# Patient Record
Sex: MALE | Race: WHITE | NOT HISPANIC OR LATINO | Employment: OTHER | URBAN - METROPOLITAN AREA
[De-identification: names, ages, dates, MRNs, and addresses within clinical notes are randomized per-mention and may not be internally consistent; named-entity substitution may affect disease eponyms.]

---

## 2023-04-08 PROBLEM — R06.01 ORTHOPNEA: Status: ACTIVE | Noted: 2023-04-08

## 2023-04-08 PROBLEM — E87.6 HYPOKALEMIA: Status: ACTIVE | Noted: 2023-04-08

## 2023-04-08 PROBLEM — I10 HYPERTENSION: Status: ACTIVE | Noted: 2023-04-08

## 2023-04-12 PROBLEM — R73.09 ELEVATED GLUCOSE: Status: ACTIVE | Noted: 2023-04-12

## 2023-04-12 PROBLEM — K76.0 FATTY LIVER: Status: ACTIVE | Noted: 2023-04-12

## 2023-04-26 ENCOUNTER — OFFICE VISIT (OUTPATIENT)
Dept: FAMILY MEDICINE CLINIC | Facility: CLINIC | Age: 73
End: 2023-04-26

## 2023-04-26 VITALS
HEIGHT: 71 IN | DIASTOLIC BLOOD PRESSURE: 92 MMHG | SYSTOLIC BLOOD PRESSURE: 160 MMHG | BODY MASS INDEX: 25.2 KG/M2 | WEIGHT: 180 LBS

## 2023-04-26 DIAGNOSIS — R73.09 ELEVATED GLUCOSE: ICD-10-CM

## 2023-04-26 DIAGNOSIS — Z13.220 SCREENING FOR LIPOID DISORDERS: ICD-10-CM

## 2023-04-26 DIAGNOSIS — Z11.59 NEED FOR HEPATITIS C SCREENING TEST: ICD-10-CM

## 2023-04-26 DIAGNOSIS — E87.6 HYPOKALEMIA: ICD-10-CM

## 2023-04-26 DIAGNOSIS — I10 PRIMARY HYPERTENSION: Primary | ICD-10-CM

## 2023-04-26 DIAGNOSIS — Z12.11 SCREEN FOR COLON CANCER: ICD-10-CM

## 2023-04-26 DIAGNOSIS — K76.0 FATTY LIVER: ICD-10-CM

## 2023-04-26 RX ORDER — LISINOPRIL 40 MG/1
40 TABLET ORAL DAILY
Qty: 30 TABLET | Refills: 0 | Status: SHIPPED | OUTPATIENT
Start: 2023-04-26

## 2023-04-26 NOTE — PROGRESS NOTES
Chief Complaint   Patient presents with   • Hypertension     Name: Sergey Goodman      : 1950      MRN: [id number]  Encounter Provider: Kurt Adamson MD  Encounter Date: 2023   Encounter department: [address] [place] [address]     Blood pressure continues to be elevated today 160/92  We will discontinue losartan and restart patient's lisinopril 40 mg   Prescription sent to pharmacy  Continue amlodipine 10 mg  He did have a low potassium in the hospital, labs were reviewed  We will recheck a CMP  Also reports a history of fatty liver, check CMP for liver function  Sugars were also found to be elevated in the hospital at 160, will recheck this  Check hepatitis C screen  Cologuard ordered as patient does not want to have a colonoscopy  RTC in 1 month for follow-up as well as Medicare annual wellness visit    1  Primary hypertension  -     lisinopril (ZESTRIL) 40 mg tablet; Take 1 tablet (40 mg total) by mouth daily    2  Hypokalemia  -     Comprehensive metabolic panel; Future    3  Fatty liver  -     Comprehensive metabolic panel; Future    4  Elevated glucose  -     Comprehensive metabolic panel; Future    5  Need for hepatitis C screening test  -     Hepatitis C antibody; Future    6  Screening for lipoid disorders  -     Lipid Panel with Direct LDL reflex; Future    7  Screen for colon cancer  -     Cologuard         Subjective      He presents today for follow-up on high blood pressure  He has been keeping a log at home  Blood pressures have been elevated in the 612L and 563H colic  Denies any headaches blurred vision or chest pain  Reports that he used to be on lisinopril 40 mg and did well on that, however the ER put him on losartan instead  Would like to restart lisinopril  Review of Systems   Constitutional: Negative for activity change, appetite change, chills, fatigue and fever  HENT: Negative for congestion, rhinorrhea, sneezing and sore throat  "   Eyes: Negative for pain, discharge, redness and itching  Respiratory: Negative for cough, chest tightness, shortness of breath and wheezing  Cardiovascular: Negative for chest pain and palpitations  Gastrointestinal: Negative for abdominal pain, constipation, diarrhea, nausea and vomiting  Musculoskeletal: Negative for arthralgias, gait problem, myalgias and neck pain  Skin: Negative for rash  Neurological: Negative for dizziness, weakness, numbness and headaches  Psychiatric/Behavioral: Negative for dysphoric mood  All other systems reviewed and are negative  Current Outpatient Medications on File Prior to Visit   Medication Sig   • amLODIPine (NORVASC) 10 mg tablet Take 1 tablet (10 mg total) by mouth daily Do not start before April 10, 2023  • [DISCONTINUED] losartan (COZAAR) 50 mg tablet Take 1 tablet (50 mg total) by mouth daily       Objective     /92   Ht 5' 11\" (1 803 m)   Wt 81 6 kg (180 lb)   BMI 25 10 kg/m²     Physical Exam  Vitals reviewed  Constitutional:       General: He is not in acute distress  Appearance: Normal appearance  He is well-developed  He is not toxic-appearing or diaphoretic  HENT:      Head: Normocephalic and atraumatic  Nose: Nose normal    Eyes:      General: No scleral icterus  Right eye: No discharge  Left eye: No discharge  Conjunctiva/sclera: Conjunctivae normal    Cardiovascular:      Rate and Rhythm: Normal rate and regular rhythm  Pulses: Normal pulses  Heart sounds: Normal heart sounds  No murmur heard  Pulmonary:      Effort: Pulmonary effort is normal  No respiratory distress  Breath sounds: Normal breath sounds  No wheezing  Abdominal:      General: Abdomen is flat  There is no distension  Palpations: Abdomen is soft  There is no mass  Tenderness: There is no abdominal tenderness  Hernia: No hernia is present  Musculoskeletal:         General: No tenderness   Normal range " of motion  Skin:     General: Skin is warm  Findings: No erythema or rash  Neurological:      Mental Status: He is alert     Psychiatric:         Mood and Affect: Mood normal          Behavior: Behavior normal        Carolina Lamas MD

## 2023-05-08 DIAGNOSIS — I16.0 HYPERTENSIVE URGENCY: ICD-10-CM

## 2023-05-08 RX ORDER — AMLODIPINE BESYLATE 10 MG/1
10 TABLET ORAL DAILY
Qty: 30 TABLET | Refills: 0 | Status: SHIPPED | OUTPATIENT
Start: 2023-05-08 | End: 2023-06-07

## 2023-05-21 DIAGNOSIS — I10 PRIMARY HYPERTENSION: ICD-10-CM

## 2023-05-22 RX ORDER — LISINOPRIL 40 MG/1
TABLET ORAL
Qty: 90 TABLET | Refills: 1 | Status: SHIPPED | OUTPATIENT
Start: 2023-05-22

## 2023-05-31 ENCOUNTER — OFFICE VISIT (OUTPATIENT)
Dept: FAMILY MEDICINE CLINIC | Facility: CLINIC | Age: 73
End: 2023-05-31

## 2023-05-31 VITALS
BODY MASS INDEX: 24.81 KG/M2 | DIASTOLIC BLOOD PRESSURE: 80 MMHG | TEMPERATURE: 97.6 F | HEIGHT: 71 IN | HEART RATE: 78 BPM | WEIGHT: 177.2 LBS | OXYGEN SATURATION: 98 % | SYSTOLIC BLOOD PRESSURE: 132 MMHG

## 2023-05-31 DIAGNOSIS — I10 PRIMARY HYPERTENSION: ICD-10-CM

## 2023-05-31 DIAGNOSIS — I16.0 HYPERTENSIVE URGENCY: ICD-10-CM

## 2023-05-31 DIAGNOSIS — Z00.00 MEDICARE ANNUAL WELLNESS VISIT, SUBSEQUENT: Primary | ICD-10-CM

## 2023-05-31 RX ORDER — AMLODIPINE BESYLATE 10 MG/1
10 TABLET ORAL DAILY
Qty: 90 TABLET | Refills: 1 | Status: SHIPPED | OUTPATIENT
Start: 2023-05-31

## 2023-05-31 NOTE — PATIENT INSTRUCTIONS
Medicare Preventive Visit Patient Instructions  Thank you for completing your Welcome to Medicare Visit or Medicare Annual Wellness Visit today  Your next wellness visit will be due in one year (5/31/2024)  The screening/preventive services that you may require over the next 5-10 years are detailed below  Some tests may not apply to you based off risk factors and/or age  Screening tests ordered at today's visit but not completed yet may show as past due  Also, please note that scanned in results may not display below  Preventive Screenings:  Service Recommendations Previous Testing/Comments   Colorectal Cancer Screening  · Colonoscopy    · Fecal Occult Blood Test (FOBT)/Fecal Immunochemical Test (FIT)  · Fecal DNA/Cologuard Test  · Flexible Sigmoidoscopy Age: 39-70 years old   Colonoscopy: every 10 years (May be performed more frequently if at higher risk)  OR  FOBT/FIT: every 1 year  OR  Cologuard: every 3 years  OR  Sigmoidoscopy: every 5 years  Screening may be recommended earlier than age 39 if at higher risk for colorectal cancer  Also, an individualized decision between you and your healthcare provider will decide whether screening between the ages of 74-80 would be appropriate   Colonoscopy: Not on file  FOBT/FIT: Not on file  Cologuard: Not on file  Sigmoidoscopy: Not on file          Prostate Cancer Screening Individualized decision between patient and health care provider in men between ages of 53-78   Medicare will cover every 12 months beginning on the day after your 50th birthday PSA: No results in last 5 years           Hepatitis C Screening Once for adults born between [facility]  More frequently in patients at high risk for Hepatitis C Hep C Antibody: Not on file        Diabetes Screening 1-2 times per year if you're at risk for diabetes or have pre-diabetes Fasting glucose: 160 mg/dL (4/9/2023)  A1C: No results in last 5 years (No results in last 5 years)  Screening Current   Cholesterol Screening Once every 5 years if you don't have a lipid disorder  May order more often based on risk factors  Lipid panel: 11/12/2020  Screening Current      Other Preventive Screenings Covered by Medicare:  1  Abdominal Aortic Aneurysm (AAA) Screening: covered once if your at risk  You're considered to be at risk if you have a family history of AAA or a male between the age of 73-68 who smoking at least 100 cigarettes in your lifetime  2  Lung Cancer Screening: covers low dose CT scan once per year if you meet all of the following conditions: (1) Age 50-69; (2) No signs or symptoms of lung cancer; (3) Current smoker or have quit smoking within the last 15 years; (4) You have a tobacco smoking history of at least 20 pack years (packs per day x number of years you smoked); (5) You get a written order from a healthcare provider  3  Glaucoma Screening: covered annually if you're considered high risk: (1) You have diabetes OR (2) Family history of glaucoma OR (3)  aged 48 and older OR (3)  American aged 72 and older  3  Osteoporosis Screening: covered every 2 years if you meet one of the following conditions: (1) Have a vertebral abnormality; (2) On glucocorticoid therapy for more than 3 months; (3) Have primary hyperparathyroidism; (4) On osteoporosis medications and need to assess response to drug therapy  5  HIV Screening: covered annually if you're between the age of 12-76  Also covered annually if you are younger than 13 and older than 72 with risk factors for HIV infection  For pregnant patients, it is covered up to 3 times per pregnancy      Immunizations:  Immunization Recommendations   Influenza Vaccine Annual influenza vaccination during flu season is recommended for all persons aged >= 6 months who do not have contraindications   Pneumococcal Vaccine   * Pneumococcal conjugate vaccine = PCV13 (Prevnar 13), PCV15 (Vaxneuvance), PCV20 (Prevnar 20)  * Pneumococcal polysaccharide vaccine = PPSV23 (Pneumovax) Adults 2364 years old: 1-3 doses may be recommended based on certain risk factors  Adults 72 years old: 1-2 doses may be recommended based off what pneumonia vaccine you previously received   Hepatitis B Vaccine 3 dose series if at intermediate or high risk (ex: diabetes, end stage renal disease, liver disease)   Tetanus (Td) Vaccine - COST NOT COVERED BY MEDICARE PART B Following completion of primary series, a booster dose should be given every 10 years to maintain immunity against tetanus  Td may also be given as tetanus wound prophylaxis  Tdap Vaccine - COST NOT COVERED BY MEDICARE PART B Recommended at least once for all adults  For pregnant patients, recommended with each pregnancy  Shingles Vaccine (Shingrix) - COST NOT COVERED BY MEDICARE PART B  2 shot series recommended in those aged 48 and above     Health Maintenance Due:      Topic Date Due   • Hepatitis C Screening  Never done   • Colorectal Cancer Screening  Never done     Immunizations Due:      Topic Date Due   • COVID-19 Vaccine (1) Never done     Advance Directives   What are advance directives? Advance directives are legal documents that state your wishes and plans for medical care  These plans are made ahead of time in case you lose your ability to make decisions for yourself  Advance directives can apply to any medical decision, such as the treatments you want, and if you want to donate organs  What are the types of advance directives? There are many types of advance directives, and each state has rules about how to use them  You may choose a combination of any of the following:  · Living will: This is a written record of the treatment you want  You can also choose which treatments you do not want, which to limit, and which to stop at a certain time  This includes surgery, medicine, IV fluid, and tube feedings  · Durable power of  for healthcare [facility]):   This is a written record that states who you want to make healthcare choices for you when you are unable to make them for yourself  This person, called a proxy, is usually a family member or a friend  You may choose more than 1 proxy  · Do not resuscitate (DNR) order:  A DNR order is used in case your heart stops beating or you stop breathing  It is a request not to have certain forms of treatment, such as CPR  A DNR order may be included in other types of advance directives  · Medical directive: This covers the care that you want if you are in a coma, near death, or unable to make decisions for yourself  You can list the treatments you want for each condition  Treatment may include pain medicine, surgery, blood transfusions, dialysis, IV or tube feedings, and a ventilator (breathing machine)  · Values history: This document has questions about your views, beliefs, and how you feel and think about life  This information can help others choose the care that you would choose  Why are advance directives important? An advance directive helps you control your care  Although spoken wishes may be used, it is better to have your wishes written down  Spoken wishes can be misunderstood, or not followed  Treatments may be given even if you do not want them  An advance directive may make it easier for your family to make difficult choices about your care  © Copyright [address] 2018 Information is for End User's use only and may not be sold, redistributed or otherwise used for commercial purposes   All illustrations and images included in CareNotes® are the copyrighted property of [employer]  or [address] [place] [address] "[employer]

## 2023-05-31 NOTE — PROGRESS NOTES
Assessment and Plan:     Patient presents for Medicare annual wellness visit  Labs ordered previously, patient to have them drawn  Patient did receive Cologuard kit, encourage patient to complete Cologuard screening  Declines CT lung cancer screening today as well as AAA screening  Will consider in the future  Amlodipine refill sent to pharmacy  RTC in 6 months for follow-up    Problem List Items Addressed This Visit        Cardiovascular and Mediastinum    Hypertension    Relevant Medications    amLODIPine (NORVASC) 10 mg tablet   Other Visit Diagnoses     Medicare annual wellness visit, subsequent    -  Primary    Hypertensive urgency        Relevant Medications    amLODIPine (NORVASC) 10 mg tablet          Depression Screening and Follow-up Plan: Patient was screened for depression during today's encounter  They screened negative with a PHQ-2 score of 0  Lung Cancer Screening Shared Decision Making: I discussed with him that he is a candidate for lung cancer CT screening  The following Shared Decision-Making points were covered:  1  Benefits of screening were discussed, including the rates of reduction in death from lung cancer and other causes  Harms of screening were reviewed, including false positive tests, radiation exposure levels, risks of invasive procedures, risks of complications of screening, and risk of overdiagnosis  2  I counseled on the importance of adherence to annual lung cancer LDCT screening, impact of co-morbidities, and ability or willingness to undergo diagnosis and treatment    3  I counseled on the importance of maintaining abstinence as a former smoker or was counseled on the importance of smoking cessation if a current smoker    Review of Eligibility Criteria: He meets all of the criteria for Lung Cancer Screening    - He is 81 y o    - He has 20 pack year tobacco history and is a current smoker or has quit within the past 15 years  - He presents no signs or symptoms of lung cancer    After discussion, the patient decided to elect lung cancer screening  Preventive health issues were discussed with patient, and age appropriate screening tests were ordered as noted in patient's After Visit Summary  Personalized health advice and appropriate referrals for health education or preventive services given if needed, as noted in patient's After Visit Summary  History of Present Illness:     Patient presents for a Medicare Wellness Visit    He presents today for Medicare annual wellness exam   Overall doing well  No complaints today  Patient Care Team:  Rhonda Lewis MD as PCP - General (Family Medicine)     Review of Systems:     Review of Systems   Constitutional: Negative for activity change, appetite change, chills, fatigue and fever  HENT: Negative for congestion, rhinorrhea, sneezing and sore throat  Eyes: Negative for pain, discharge, redness and itching  Respiratory: Negative for cough, chest tightness, shortness of breath and wheezing  Cardiovascular: Negative for chest pain and palpitations  Gastrointestinal: Negative for abdominal pain, constipation, diarrhea, nausea and vomiting  Musculoskeletal: Negative for arthralgias, gait problem, myalgias and neck pain  Skin: Negative for rash  Neurological: Negative for dizziness, weakness, numbness and headaches  Psychiatric/Behavioral: Negative for dysphoric mood and suicidal ideas  The patient is not nervous/anxious  All other systems reviewed and are negative  Problem List:     Patient Active Problem List   Diagnosis   • Hypertension   • Hypokalemia   • Orthopnea   • Fatty liver   • Elevated glucose      Past Medical and Surgical History:     Past Medical History:   Diagnosis Date   • Hypertension    • Liver disease      Past Surgical History:   Procedure Laterality Date   • PROSTATE SURGERY        Family History:     History reviewed  No pertinent family history     Social History: Social History     Socioeconomic History   • Marital status: Single     Spouse name: None   • Number of children: None   • Years of education: None   • Highest education level: None   Occupational History   • None   Tobacco Use   • Smoking status: Former     Types: Cigarettes     Quit date: 2020     Years since quitting: 3 4   • Smokeless tobacco: Never   Vaping Use   • Vaping Use: Never used   Substance and Sexual Activity   • Alcohol use: Not Currently   • Drug use: Not Currently   • Sexual activity: None   Other Topics Concern   • None   Social History Narrative   • None     Social Determinants of Health     Financial Resource Strain: Not on file   Food Insecurity: Not on file   Transportation Needs: Not on file   Physical Activity: Not on file   Stress: Not on file   Social Connections: Not on file   Intimate Partner Violence: Not on file   Housing Stability: Not on file      Medications and Allergies:     Current Outpatient Medications   Medication Sig Dispense Refill   • amLODIPine (NORVASC) 10 mg tablet Take 1 tablet (10 mg total) by mouth daily 90 tablet 1   • lisinopril (ZESTRIL) 40 mg tablet TAKE 1 TABLET BY MOUTH EVERY DAY 90 tablet 1     No current facility-administered medications for this visit  No Known Allergies   Immunizations:     Immunization History   Administered Date(s) Administered   • INFLUENZA 11/04/2020   • Influenza Split High Dose Preservative Free IM 11/18/2022   • Influenza, Seasonal Vaccine, Quadrivalent, Adjuvanted,  5e 11/22/2021   • Pneumococcal Conjugate 13-Valent 02/19/2020   • Pneumococcal Polysaccharide PPV23 11/22/2021, 11/18/2022      Health Maintenance:         Topic Date Due   • Hepatitis C Screening  Never done   • Colorectal Cancer Screening  Never done         Topic Date Due   • COVID-19 Vaccine (1) Never done      Medicare Screening Tests and Risk Assessments:     Edy is here for his Subsequent Wellness visit   Last Medicare Wellness visit information reviewed, patient interviewed and updates made to the record today  Health Risk Assessment:   Patient rates overall health as good  Patient feels that their physical health rating is same  Patient is satisfied with their life  Eyesight was rated as same  Hearing was rated as slightly worse  Patient feels that their emotional and mental health rating is same  Patients states they are never, rarely angry  Patient states they are sometimes unusually tired/fatigued  Pain experienced in the last 7 days has been none  Patient states that he has experienced no weight loss or gain in last 6 months  Depression Screening:   PHQ-2 Score: 0      Fall Risk Screening: In the past year, patient has experienced: no history of falling in past year      Home Safety:  Patient does not have trouble with stairs inside or outside of their home  Patient has working smoke alarms and has working carbon monoxide detector  Home safety hazards include: none  Nutrition:   Current diet is Regular  Medications:   Patient is not currently taking any over-the-counter supplements  Patient is able to manage medications  Activities of Daily Living (ADLs)/Instrumental Activities of Daily Living (IADLs):   Walk and transfer into and out of bed and chair?: Yes  Dress and groom yourself?: Yes    Bathe or shower yourself?: Yes    Feed yourself?  Yes  Do your laundry/housekeeping?: Yes  Manage your money, pay your bills and track your expenses?: Yes  Make your own meals?: Yes    Do your own shopping?: Yes    Previous Hospitalizations:   Any hospitalizations or ED visits within the last 12 months?: Yes    How many hospitalizations have you had in the last year?: 1-2    Advance Care Planning:   Living will: No    Durable POA for healthcare: No    Advanced directive: No      PREVENTIVE SCREENINGS      Cardiovascular Screening:    General: Screening Current      Diabetes Screening:     General: Screening Current      Colorectal Cancer Screening: "General: Risks and Benefits Discussed    Due for: Cologuard      Prostate Cancer Screening:    General: Patient Declines and Risks and Benefits Discussed    Due for: PSA      Osteoporosis Screening:    General: Screening Not Indicated      Abdominal Aortic Aneurysm (AAA) Screening:    Risk factors include: age between 73-67 yo and tobacco use        General: Risks and Benefits Discussed and Patient Declines    Due for: Screening AAA Ultrasound      Lung Cancer Screening:     General: Risks and Benefits Discussed and Patient Declines    Due for: Low Dose CT (LDCT)      Hepatitis C Screening:    General: Risks and Benefits Discussed    Hep C Screening Accepted: Yes      Screening, Brief Intervention, and Referral to Treatment (SBIRT)    Screening      Single Item Drug Screening:  How often have you used an illegal drug (including marijuana) or a prescription medication for non-medical reasons in the past year? never    Single Item Drug Screen Score: 0  Interpretation: Negative screen for possible drug use disorder    No results found  Physical Exam:     /80   Pulse 78   Temp 97 6 °F (36 4 °C)   Ht 5' 11\" (1 803 m)   Wt 80 4 kg (177 lb 3 2 oz)   SpO2 98%   BMI 24 71 kg/m²     Physical Exam  Vitals reviewed  Constitutional:       General: He is not in acute distress  Appearance: Normal appearance  He is well-developed and normal weight  He is not ill-appearing or toxic-appearing  HENT:      Head: Normocephalic and atraumatic  Right Ear: Tympanic membrane, ear canal and external ear normal  There is no impacted cerumen  Left Ear: Tympanic membrane, ear canal and external ear normal  There is no impacted cerumen  Nose: Nose normal  No congestion or rhinorrhea  Mouth/Throat:      Mouth: Mucous membranes are moist       Pharynx: Oropharynx is clear  No oropharyngeal exudate or posterior oropharyngeal erythema  Eyes:      General: No scleral icterus          Right eye: No " discharge  Left eye: No discharge  Conjunctiva/sclera: Conjunctivae normal       Pupils: Pupils are equal, round, and reactive to light  Cardiovascular:      Rate and Rhythm: Normal rate and regular rhythm  Pulses: Normal pulses  Heart sounds: Normal heart sounds  No murmur heard  Pulmonary:      Effort: Pulmonary effort is normal  No respiratory distress  Breath sounds: Normal breath sounds  Abdominal:      General: Abdomen is flat  There is no distension  Palpations: Abdomen is soft  Tenderness: There is no abdominal tenderness  Musculoskeletal:         General: No swelling or tenderness  Normal range of motion  Cervical back: Normal range of motion  Skin:     General: Skin is warm and dry  Capillary Refill: Capillary refill takes less than 2 seconds  Findings: No rash  Neurological:      General: No focal deficit present  Mental Status: He is alert  Motor: No weakness     Psychiatric:         Mood and Affect: Mood normal          Behavior: Behavior normal           Rolanda Jacobson MD

## 2023-06-08 ENCOUNTER — LAB (OUTPATIENT)
Dept: LAB | Facility: CLINIC | Age: 73
End: 2023-06-08
Payer: COMMERCIAL

## 2023-06-08 DIAGNOSIS — Z13.220 SCREENING FOR LIPOID DISORDERS: ICD-10-CM

## 2023-06-08 DIAGNOSIS — K76.0 FATTY LIVER: ICD-10-CM

## 2023-06-08 DIAGNOSIS — E87.6 HYPOKALEMIA: ICD-10-CM

## 2023-06-08 DIAGNOSIS — Z11.59 NEED FOR HEPATITIS C SCREENING TEST: ICD-10-CM

## 2023-06-08 DIAGNOSIS — R73.09 ELEVATED GLUCOSE: ICD-10-CM

## 2023-06-08 LAB
ALBUMIN SERPL BCP-MCNC: 4.4 G/DL (ref 3.5–5)
ALP SERPL-CCNC: 112 U/L (ref 46–116)
ALT SERPL W P-5'-P-CCNC: 29 U/L (ref 12–78)
ANION GAP SERPL CALCULATED.3IONS-SCNC: 5 MMOL/L (ref 4–13)
AST SERPL W P-5'-P-CCNC: 19 U/L (ref 5–45)
BILIRUB SERPL-MCNC: 1.85 MG/DL (ref 0.2–1)
BUN SERPL-MCNC: 18 MG/DL (ref 5–25)
CALCIUM SERPL-MCNC: 9.3 MG/DL (ref 8.3–10.1)
CHLORIDE SERPL-SCNC: 109 MMOL/L (ref 96–108)
CHOLEST SERPL-MCNC: 221 MG/DL
CO2 SERPL-SCNC: 26 MMOL/L (ref 21–32)
CREAT SERPL-MCNC: 1.15 MG/DL (ref 0.6–1.3)
GFR SERPL CREATININE-BSD FRML MDRD: 64 ML/MIN/1.73SQ M
GLUCOSE P FAST SERPL-MCNC: 150 MG/DL (ref 65–99)
HCV AB SER QL: NORMAL
HDLC SERPL-MCNC: 56 MG/DL
LDLC SERPL CALC-MCNC: 133 MG/DL (ref 0–100)
POTASSIUM SERPL-SCNC: 3.7 MMOL/L (ref 3.5–5.3)
PROT SERPL-MCNC: 8.3 G/DL (ref 6.4–8.4)
SODIUM SERPL-SCNC: 140 MMOL/L (ref 135–147)
TRIGL SERPL-MCNC: 161 MG/DL

## 2023-06-08 PROCEDURE — 36415 COLL VENOUS BLD VENIPUNCTURE: CPT

## 2023-06-08 PROCEDURE — 86803 HEPATITIS C AB TEST: CPT

## 2023-06-08 PROCEDURE — 80061 LIPID PANEL: CPT

## 2023-06-08 PROCEDURE — 80053 COMPREHEN METABOLIC PANEL: CPT

## 2023-06-09 DIAGNOSIS — R73.09 ELEVATED GLUCOSE: Primary | ICD-10-CM

## 2023-06-14 ENCOUNTER — APPOINTMENT (OUTPATIENT)
Dept: LAB | Facility: CLINIC | Age: 73
End: 2023-06-14
Payer: COMMERCIAL

## 2023-06-14 DIAGNOSIS — R73.09 ELEVATED GLUCOSE: ICD-10-CM

## 2023-06-14 LAB
EST. AVERAGE GLUCOSE BLD GHB EST-MCNC: 137 MG/DL
HBA1C MFR BLD: 6.4 %

## 2023-06-14 PROCEDURE — 83036 HEMOGLOBIN GLYCOSYLATED A1C: CPT

## 2023-06-14 PROCEDURE — 36415 COLL VENOUS BLD VENIPUNCTURE: CPT

## 2023-11-12 DIAGNOSIS — I16.0 HYPERTENSIVE URGENCY: ICD-10-CM

## 2023-11-12 DIAGNOSIS — I10 PRIMARY HYPERTENSION: ICD-10-CM

## 2023-11-13 RX ORDER — AMLODIPINE BESYLATE 10 MG/1
10 TABLET ORAL DAILY
Qty: 90 TABLET | Refills: 1 | Status: SHIPPED | OUTPATIENT
Start: 2023-11-13

## 2023-11-13 RX ORDER — LISINOPRIL 40 MG/1
TABLET ORAL
Qty: 90 TABLET | Refills: 1 | Status: SHIPPED | OUTPATIENT
Start: 2023-11-13

## 2023-11-19 ENCOUNTER — RA CDI HCC (OUTPATIENT)
Dept: OTHER | Facility: HOSPITAL | Age: 73
End: 2023-11-19

## 2023-11-19 NOTE — PROGRESS NOTES
[address] [facility] coding opportunities       Chart reviewed, no opportunity found: [address]        Patients Insurance     Medicare Insurance: [employer] Advantage

## 2023-11-29 ENCOUNTER — OFFICE VISIT (OUTPATIENT)
Dept: FAMILY MEDICINE CLINIC | Facility: CLINIC | Age: 73
End: 2023-11-29
Payer: COMMERCIAL

## 2023-11-29 VITALS
OXYGEN SATURATION: 98 % | SYSTOLIC BLOOD PRESSURE: 128 MMHG | DIASTOLIC BLOOD PRESSURE: 78 MMHG | HEART RATE: 82 BPM | TEMPERATURE: 97.9 F | HEIGHT: 71 IN | BODY MASS INDEX: 25.09 KG/M2 | WEIGHT: 179.2 LBS

## 2023-11-29 DIAGNOSIS — K76.0 FATTY LIVER: ICD-10-CM

## 2023-11-29 DIAGNOSIS — Z23 ENCOUNTER FOR IMMUNIZATION: ICD-10-CM

## 2023-11-29 DIAGNOSIS — I10 PRIMARY HYPERTENSION: Primary | ICD-10-CM

## 2023-11-29 DIAGNOSIS — R73.09 ELEVATED GLUCOSE: ICD-10-CM

## 2023-11-29 DIAGNOSIS — R25.1 TREMORS OF NERVOUS SYSTEM: ICD-10-CM

## 2023-11-29 DIAGNOSIS — E78.2 MIXED HYPERLIPIDEMIA: ICD-10-CM

## 2023-11-29 DIAGNOSIS — R73.03 PREDIABETES: ICD-10-CM

## 2023-11-29 PROCEDURE — 90662 IIV NO PRSV INCREASED AG IM: CPT

## 2023-11-29 PROCEDURE — 90677 PCV20 VACCINE IM: CPT

## 2023-11-29 PROCEDURE — G0008 ADMIN INFLUENZA VIRUS VAC: HCPCS

## 2023-11-29 PROCEDURE — G0009 ADMIN PNEUMOCOCCAL VACCINE: HCPCS

## 2023-11-29 PROCEDURE — 99214 OFFICE O/P EST MOD 30 MIN: CPT | Performed by: FAMILY MEDICINE

## 2023-11-29 RX ORDER — ROSUVASTATIN CALCIUM 10 MG/1
10 TABLET, COATED ORAL DAILY
Qty: 90 TABLET | Refills: 1 | Status: SHIPPED | OUTPATIENT
Start: 2023-11-29

## 2023-11-29 NOTE — PROGRESS NOTES
Chief Complaint   Patient presents with   • Hypertension     No refills needed      Name: Corine Baltazar      : 1950      MRN: [id number]  Encounter Provider: Sara Wei MD  Encounter Date: 2023   Encounter department: [address] [facility]     Blood pressure stable today 128/78, continue BP regimen as prescribed. He does have a history of fatty liver, we will continue to check liver enzymes. I did review his most recent blood work that did show elevations in his lipid panel as well as an HbA1c of 6.4. I do recommend patient to start a statin medication and work on diet and exercise. He is agreeable, we will start with a smaller dose of Crestor as he does have history of fatty liver. Intention tremor for quite some time now, we will refer patient to neurology. PCV 20 as well as influenza vaccine administered today. RTC in 6 months for annual physical exam and follow-up    1. Primary hypertension    2. Fatty liver    3. Elevated glucose    4. Mixed hyperlipidemia  -     rosuvastatin (CRESTOR) 10 MG tablet; Take 1 tablet (10 mg total) by mouth daily    5. Encounter for immunization  -     influenza vaccine, high-dose, PF 0.7 mL (FLUZONE HIGH-DOSE)  -     Pneumococcal Conjugate Vaccine 20-valent (Pcv20)    6. Prediabetes    7. Tremors of nervous system  -     Ambulatory Referral to Neurology; Future           Subjective      He presents today for follow-up on hypertension, fatty liver, elevated sugars and elevated cholesterols. States has been compliant with his blood pressure medication. States that he has been having tremors for quite some time mostly when he is reaching for something. No family history of movement disorders. Denies any other complaints today. Review of Systems   Constitutional:  Negative for activity change, appetite change, chills, fatigue and fever. HENT:  Negative for congestion, rhinorrhea, sneezing and sore throat.     Eyes: Negative for pain, discharge, redness and itching. Respiratory:  Negative for cough, chest tightness, shortness of breath and wheezing. Cardiovascular:  Negative for chest pain and palpitations. Gastrointestinal:  Negative for abdominal pain, constipation, diarrhea, nausea and vomiting. Musculoskeletal:  Negative for arthralgias, gait problem, myalgias and neck pain. Skin:  Negative for rash. Neurological:  Positive for tremors. Negative for dizziness, weakness, numbness and headaches. Hematological:  Negative for adenopathy. Psychiatric/Behavioral:  Negative for dysphoric mood. The patient is not nervous/anxious. All other systems reviewed and are negative. Current Outpatient Medications on File Prior to Visit   Medication Sig   • amLODIPine (NORVASC) 10 mg tablet TAKE 1 TABLET BY MOUTH EVERY DAY   • lisinopril (ZESTRIL) 40 mg tablet TAKE 1 TABLET BY MOUTH EVERY DAY       Objective     /78   Pulse 82   Temp 97.9 °F (36.6 °C)   Ht 5' 11" (1.803 m)   Wt 81.3 kg (179 lb 3.2 oz)   SpO2 98%   BMI 24.99 kg/m²     Physical Exam  Vitals reviewed. Constitutional:       General: He is not in acute distress. Appearance: Normal appearance. He is well-developed. He is not diaphoretic. HENT:      Head: Normocephalic and atraumatic. Right Ear: External ear normal.      Left Ear: External ear normal.      Nose: Nose normal.      Mouth/Throat:      Mouth: Mucous membranes are moist.   Eyes:      General: No scleral icterus. Right eye: No discharge. Left eye: No discharge. Conjunctiva/sclera: Conjunctivae normal.   Cardiovascular:      Rate and Rhythm: Normal rate and regular rhythm. Pulses: Normal pulses. Heart sounds: Normal heart sounds. No murmur heard. Pulmonary:      Effort: Pulmonary effort is normal. No respiratory distress. Breath sounds: Normal breath sounds. No wheezing. Abdominal:      General: Abdomen is flat.  There is no distension. Palpations: Abdomen is soft. There is no mass. Tenderness: There is no abdominal tenderness. Hernia: No hernia is present. Musculoskeletal:         General: No tenderness. Normal range of motion. Cervical back: Normal range of motion. Skin:     General: Skin is warm. Capillary Refill: Capillary refill takes less than 2 seconds. Findings: No erythema or rash. Neurological:      General: No focal deficit present. Mental Status: He is alert.       Comments: Tremors of hand   Psychiatric:         Mood and Affect: Mood normal.         Behavior: Behavior normal.       Ellis Cee MD

## 2023-12-04 ENCOUNTER — TELEPHONE (OUTPATIENT)
Dept: NEUROLOGY | Facility: CLINIC | Age: 73
End: 2023-12-04

## 2023-12-11 NOTE — TELEPHONE ENCOUNTER
2nd attempt to reach patient from triage. No answer. Left message on machine. Message sent via [address] [place] [address].

## 2024-01-22 NOTE — PROGRESS NOTES
Patient ID: Edy Strickland is a 73 y.o. male.    Assessment:    Diagnoses and all orders for this visit:    Tremors of nervous system, Parkinsonian features    Edy Strickland is a 73 y.o. male is seen today in the Neurology Clinic as a new patient for tremors.  Patient has had these tremors for approximately 1 year now.  At this time, patient unable to identify a level of severity but states that it does affect him when he is in social settings.  On examination, there were tremors on finger-nose testing along with tremors on outstretched hands.  At times, tremors were worse in the right hand and at times they were worse in the left hand.  Also, there was some rigidity in the right arm and elbow observed.  Given the history and the examination, unable to determine tremor versus Parkinson's disease or rule out stroke.  We will order further testing to evaluate.    In regards to medications, since patient is already been dealing with this for approximately 1 year, we will defer this decision to the next visit in 3 months.  Patient and his son agreeable.  We verbalized that if there is any other questions or concerns they are most welcome to give us a call back.    Impression:  At this time given that his exam shows tremors on FTN testing and tremors at rest along with some rigidity in the R arm, likely etiology is essential tremor vs. Parkinson's disease vs. stroke.       Plan:  Medication deferred at this time   Possibly consider Clonazepam in the future    Imaging: At this point, would recommend MRI Brain wo contrast and JUDY scan with NM consultation   All questions addressed and patient verbalized understanding  Monitor for any worsening symptoms  Call of any questions or concerns      The patient indicates understanding of these issues and agrees with the plan.    Return in about 3 months (around 4/23/2024).    This patient case was discussed with Dr. Be.       Subjective:    HPI    Patient is a 73-year-old male  presenting to the clinic today in regards to an evaluation of tremors.  Patient has a past medical history of fatty liver, hypertension, hypokalemia, orthopnea, elevated glucose, hyperlipidemia, and smoking.  Patient was referred to Neurology by Dr. Katerina Pimentel.  Today, patient is accompanied by his son.     Tremor    He complains of tremor. Tremor began about 1 year ago. His tremor primarily involves the bilateral hands and bilateral legs and occurs with action and at rest. Patient does endorse that the tremor is worse in the R arm compared to all the other extremities. Onset of symptoms was gradual, starting about 1 year ago. Patient cannot describe his tremor severity but states that it is exacerbated by excitement. This tremor affects his handwriting, eating from a spoon, and drinking from a cup. Patient denies spilling any food on himself while trying to feed himself. Tremor is alleviated by calming himself down. Symptoms occur intermittently and last  seconds versus until he calms himself down . He denies voice change, sleep disturbance, drooling during sleep (wet pillows), rigidity, postural changes, difficulty with walking, difficult with posture, difficulty with swallowing, and balance problems.  Patient states that it affects his daily living when he is out in social settings such as being at a restaurant.    Of note, the patient states that his sleep has been disturbed for the last 5 years because he falls asleep watching tv and cannot sleep more than 3 hours at one time. Along with that the son stated how there were important conversations some years back and the patient does not remember them. Patient stated that he was not able to hear at that time since he received his hearing aids only 1 year ago.     There has been no prior evaluation for tremors.    There are no other questions or concerns at this time.    The following portions of the patient's history were reviewed and updated as appropriate:  "He  has a past medical history of Hypertension and Liver disease.    Patient Active Problem List    Diagnosis Date Noted    Fatty liver 04/12/2023    Elevated glucose 04/12/2023    Hypertension 04/08/2023    Hypokalemia 04/08/2023    Orthopnea 04/08/2023    Abdominal wall asymmetry 04/15/2021    Pure hypercholesterolemia 04/15/2021    Mixed hyperlipidemia 12/03/2020    Pulmonary nodule 11/12/2020    Nocturnal polyuria 01/17/2020    Erectile dysfunction following prostate ablative therapy 05/07/2019    Benign prostatic hyperplasia with lower urinary tract symptoms 12/18/2018     He  has a past surgical history that includes Prostate surgery.  His family history is not on file.  He  reports that he quit smoking about 4 years ago. His smoking use included cigarettes. He has never used smokeless tobacco. He reports that he does not currently use alcohol. He reports that he does not currently use drugs.    Current Outpatient Medications   Medication Sig Dispense Refill    amLODIPine (NORVASC) 10 mg tablet TAKE 1 TABLET BY MOUTH EVERY DAY 90 tablet 1    lisinopril (ZESTRIL) 40 mg tablet TAKE 1 TABLET BY MOUTH EVERY DAY 90 tablet 1    rosuvastatin (CRESTOR) 10 MG tablet Take 1 tablet (10 mg total) by mouth daily 90 tablet 1     No current facility-administered medications for this visit.     Current Outpatient Medications on File Prior to Visit   Medication Sig    amLODIPine (NORVASC) 10 mg tablet TAKE 1 TABLET BY MOUTH EVERY DAY    lisinopril (ZESTRIL) 40 mg tablet TAKE 1 TABLET BY MOUTH EVERY DAY    rosuvastatin (CRESTOR) 10 MG tablet Take 1 tablet (10 mg total) by mouth daily     No current facility-administered medications on file prior to visit.     He has No Known Allergies..         Objective:    Blood pressure 158/70, pulse 56, temperature (!) 97.4 °F (36.3 °C), temperature source Temporal, resp. rate 16, height 5' 11\" (1.803 m), weight 80.3 kg (177 lb), SpO2 98%.    Physical Exam  Vitals reviewed. "   Constitutional:       Appearance: Normal appearance.   HENT:      Head: Normocephalic and atraumatic.      Right Ear: Hearing normal.      Left Ear: Hearing normal.      Mouth/Throat:      Mouth: Mucous membranes are moist.   Eyes:      General: Lids are normal.      Extraocular Movements: Extraocular movements intact.      Conjunctiva/sclera: Conjunctivae normal.      Pupils: Pupils are equal, round, and reactive to light.   Cardiovascular:      Rate and Rhythm: Normal rate.   Pulmonary:      Effort: Pulmonary effort is normal.   Musculoskeletal:         General: Normal range of motion.      Right lower leg: No edema.      Left lower leg: No edema.   Neurological:      Mental Status: He is alert.      Motor: Motor strength is normal.     Coordination: Romberg sign negative.      Deep Tendon Reflexes:      Reflex Scores:       Tricep reflexes are 2+ on the right side and 2+ on the left side.       Bicep reflexes are 2+ on the right side and 2+ on the left side.       Brachioradialis reflexes are 2+ on the right side and 2+ on the left side.       Patellar reflexes are 2+ on the right side and 2+ on the left side.       Achilles reflexes are 2+ on the right side and 2+ on the left side.  Psychiatric:         Mood and Affect: Mood normal.         Speech: Speech normal.         Behavior: Behavior normal.         Neurological Exam  Mental Status  Alert. Oriented to person, place and time. Speech is normal. Language is fluent with no aphasia.    Cranial Nerves  CN II: Visual acuity is normal. Visual fields full to confrontation.  CN III, IV, VI: Extraocular movements intact bilaterally. Normal lids and orbits bilaterally. Pupils equal round and reactive to light bilaterally.  CN V: Facial sensation is normal.  CN VII: Full and symmetric facial movement.  CN VIII:  Right: Hearing is normal. Hearing aids.  Left: Hearing is normal. Hear aids.  CN IX, X: Palate elevates symmetrically. Normal gag reflex.  CN XI: Shoulder  shrug strength is normal.  CN XII: Tongue midline without atrophy or fasciculations.    Motor  Normal muscle bulk throughout. No fasciculations present. Increased muscle tone. Mildly increased rigidity seen in the R wrist and R arm. The following abnormal movements were seen: Tremors seen at rest in the R hand. Tremors seen in the R arm and L arm on FTN testing.   Strength is 5/5 throughout all four extremities.    Sensory  Light touch is normal in upper and lower extremities. Pinprick is normal in upper and lower extremities. Vibration is normal in upper and lower extremities.     Reflexes                                            Right                      Left  Brachioradialis                    2+                         2+  Biceps                                 2+                         2+  Triceps                                2+                         2+  Patellar                                2+                         2+  Achilles                                2+                         2+    Coordination  Right: Finger-to-nose normal. Mild end-point tremor seen. Rapid alternating movement normal.Left: Finger-to-nose normal. Mild end-point tremor seen. Rapid alternating movement normal.    Gait  Casual gait is normal including stance, stride, and arm swing.Normal toe walking. Normal heel walking. Tandem gait abnormality: For the most part the patient was able to do but in the starting, he lost some balance. Romberg is absent.        ROS:    Review of Systems   Neurological:  Positive for tremors. Negative for dizziness, seizures, syncope, facial asymmetry, speech difficulty, weakness, light-headedness, numbness and headaches.

## 2024-01-23 ENCOUNTER — CONSULT (OUTPATIENT)
Dept: NEUROLOGY | Facility: CLINIC | Age: 74
End: 2024-01-23
Payer: COMMERCIAL

## 2024-01-23 VITALS
OXYGEN SATURATION: 98 % | RESPIRATION RATE: 16 BRPM | TEMPERATURE: 97.4 F | SYSTOLIC BLOOD PRESSURE: 158 MMHG | WEIGHT: 177 LBS | BODY MASS INDEX: 24.78 KG/M2 | HEIGHT: 71 IN | DIASTOLIC BLOOD PRESSURE: 70 MMHG | HEART RATE: 56 BPM

## 2024-01-23 DIAGNOSIS — R29.818 PARKINSONIAN FEATURES: ICD-10-CM

## 2024-01-23 DIAGNOSIS — R25.1 TREMORS OF NERVOUS SYSTEM: Primary | ICD-10-CM

## 2024-01-23 PROBLEM — R35.81 NOCTURNAL POLYURIA: Status: ACTIVE | Noted: 2020-01-17

## 2024-01-23 PROBLEM — N40.1 BENIGN PROSTATIC HYPERPLASIA WITH LOWER URINARY TRACT SYMPTOMS: Status: ACTIVE | Noted: 2018-12-18

## 2024-01-23 PROBLEM — R91.1 PULMONARY NODULE: Status: ACTIVE | Noted: 2020-11-12

## 2024-01-23 PROBLEM — E78.2 MIXED HYPERLIPIDEMIA: Status: ACTIVE | Noted: 2020-12-03

## 2024-01-23 PROBLEM — N52.37 ERECTILE DYSFUNCTION FOLLOWING PROSTATE ABLATIVE THERAPY: Status: ACTIVE | Noted: 2019-05-07

## 2024-01-23 PROBLEM — E78.00 PURE HYPERCHOLESTEROLEMIA: Status: ACTIVE | Noted: 2021-04-15

## 2024-01-23 PROBLEM — R19.8 ABDOMINAL WALL ASYMMETRY: Status: ACTIVE | Noted: 2021-04-15

## 2024-01-23 PROCEDURE — 99204 OFFICE O/P NEW MOD 45 MIN: CPT | Performed by: PSYCHIATRY & NEUROLOGY

## 2024-01-23 NOTE — PATIENT INSTRUCTIONS
Obtain the following imaging:   NM brain davy scan w/Rx, NM consultation for davy scan  MRI brain wo contrast

## 2024-02-06 ENCOUNTER — HOSPITAL ENCOUNTER (OUTPATIENT)
Dept: MRI IMAGING | Facility: HOSPITAL | Age: 74
Discharge: HOME | End: 2024-02-06

## 2024-02-06 DIAGNOSIS — R25.1 TREMORS OF NERVOUS SYSTEM: ICD-10-CM

## 2024-02-08 ENCOUNTER — HOSPITAL ENCOUNTER (OUTPATIENT)
Dept: RADIOLOGY | Facility: HOSPITAL | Age: 74
Discharge: HOME | End: 2024-02-08

## 2024-02-08 DIAGNOSIS — R25.1 TREMORS OF NERVOUS SYSTEM: ICD-10-CM

## 2024-02-15 ENCOUNTER — TELEPHONE (OUTPATIENT)
Dept: NEUROLOGY | Facility: CLINIC | Age: 74
End: 2024-02-15

## 2024-02-15 NOTE — TELEPHONE ENCOUNTER
Recd  2/13 2:44 PM    Mikie Strickland.   I'm calling on behalf of my father, Edy Strickland, his birth date is December 5th. 1950. I'm calling jolanta we had  a few imaging procedures scheduled for him, one of which was a davy scan. And other of which was an M R I that was requested by your office. And he had some trouble at those appointments actually going through with the procedures. I was calling to see what our options were to  to work around the issues that he had and, and, and get some answers about what we could do is next step so that he can get these done. So if you can give me a call back so I can ask him my, my questions that I appreciate it.

## 2024-02-27 ENCOUNTER — TELEPHONE (OUTPATIENT)
Dept: NEUROLOGY | Facility: CLINIC | Age: 74
End: 2024-02-27

## 2024-02-27 NOTE — TELEPHONE ENCOUNTER
Patient son called back. Discussed Dr. Puente's recommendation. Explained to patient I would discuss further with Dr. Puente and the office would reach back out with an update. Patient son understood.

## 2024-02-27 NOTE — TELEPHONE ENCOUNTER
Called patient son to notify about an order for a MRI that was placed. Left a voicemail and awaiting for a call back.

## 2024-02-28 ENCOUNTER — TELEPHONE (OUTPATIENT)
Dept: OTHER | Facility: HOSPITAL | Age: 74
End: 2024-02-28

## 2024-02-28 ENCOUNTER — RA CDI HCC (OUTPATIENT)
Dept: OTHER | Facility: HOSPITAL | Age: 74
End: 2024-02-28

## 2024-02-29 NOTE — TELEPHONE ENCOUNTER
Patient's son left a voicemail with our clinical team in regards to the patient being unable to complete his MRI and his DaTscan due to possible claustrophobia.  He was also requesting possible medication to help the patient calm down during the imaging.    Today, I called the patient's son back and he stated that he found out some more information from the patient.  Patient's son states that he is not really sure if this is more claustrophobia at this point because the patient states that whenever he lays down he feels as if he cannot breathe.  Given this information, patient has an appointment with his PCP, Dr. Pimentel, on 3/1/2024.  Patient states that he will wait until that appointment, see how the appointment goes, and then decide how to approach further care for his father.    Given that the patient has an appointment with his PCP, I communicated to the son that it is vital that he follows up with the PCP because I would not be able to help him in regards to breathing problems.  I also communicated to him that if at the end of the conversation with the patient's PCP they decide to go for further imaging, patient's son can request his PCP to prescribe Ativan for the scans if the PCP is comfortable enough.  If not, then the patient's son is most welcome to give us a call back and we will arrange for pre-medication for the imaging.    Along with that, patient's son was questioning whether following up with neurology would hold any validity if the scans are not completed.  He also asked if getting 1 scan done at a time would help.  In regards to that, I spoke to the son that following up would be appropriate so I can monitor his exam and even if we can get 1 imaging, then it will help guide us in regards to her management.  I did emphasize to the son that if he chooses to not follow-up with us and he feels that patient's PCP can also manage this then he is most welcome to cancel the appointment as well.  Also,  encouraged the patient's son to give us a call back after the appointment with his PCP so that way we are all informed.    All of the patient's son's questions were answered to the best of my abilities.    At the end of our conversation, patient's son showed understanding of the conversation.

## 2024-03-01 ENCOUNTER — TELEPHONE (OUTPATIENT)
Dept: NEUROLOGY | Facility: CLINIC | Age: 74
End: 2024-03-01

## 2024-03-01 ENCOUNTER — OFFICE VISIT (OUTPATIENT)
Dept: FAMILY MEDICINE CLINIC | Facility: CLINIC | Age: 74
End: 2024-03-01
Payer: COMMERCIAL

## 2024-03-01 VITALS
OXYGEN SATURATION: 98 % | SYSTOLIC BLOOD PRESSURE: 126 MMHG | HEIGHT: 71 IN | DIASTOLIC BLOOD PRESSURE: 78 MMHG | TEMPERATURE: 97.5 F | BODY MASS INDEX: 24.25 KG/M2 | HEART RATE: 73 BPM | WEIGHT: 173.2 LBS

## 2024-03-01 DIAGNOSIS — F40.240 CLAUSTROPHOBIA: ICD-10-CM

## 2024-03-01 DIAGNOSIS — E78.2 MIXED HYPERLIPIDEMIA: Primary | ICD-10-CM

## 2024-03-01 DIAGNOSIS — R49.9 CHANGE IN VOICE: ICD-10-CM

## 2024-03-01 DIAGNOSIS — I10 PRIMARY HYPERTENSION: ICD-10-CM

## 2024-03-01 DIAGNOSIS — N18.2 STAGE 2 CHRONIC KIDNEY DISEASE: ICD-10-CM

## 2024-03-01 DIAGNOSIS — R25.1 TREMORS OF NERVOUS SYSTEM: ICD-10-CM

## 2024-03-01 PROCEDURE — G2211 COMPLEX E/M VISIT ADD ON: HCPCS | Performed by: FAMILY MEDICINE

## 2024-03-01 PROCEDURE — 99214 OFFICE O/P EST MOD 30 MIN: CPT | Performed by: FAMILY MEDICINE

## 2024-03-01 RX ORDER — DIAZEPAM 5 MG/1
TABLET ORAL
Qty: 2 TABLET | Refills: 0 | Status: SHIPPED | OUTPATIENT
Start: 2024-03-01

## 2024-03-01 NOTE — TELEPHONE ENCOUNTER
Patient's son called and stated that patient was unable to complete imaging due to having anxiety.  Patient's PCP prescribed Valium for times as such patient could take medication.  Patient's son would like to know if it is ok for patient to take valium before going in to complete Datscan?  Please advise   Call son Mikie with response [phone]    Thank you!

## 2024-03-01 NOTE — TELEPHONE ENCOUNTER
Called pt's son re: below and left a detailed VM with call back #. Awaiting return call.    Dr. Puente - are you agreeable to pt using his prescribed anxiety medication (Valium 5 mg) for his DaTscan?    Please advise. Thank you.

## 2024-03-01 NOTE — PROGRESS NOTES
Name: Edy Strickland      : 1950      MRN: [id number]  Encounter Provider: Katerina Pimentel MD  Encounter Date: 3/1/2024   Encounter department: [facility]    Assessment & Plan     I believe that there is an anxiety component here which includes claustrophobia.  I will provide patient with benzodiazepine to be used prior to next attempt at imaging.  Instructed on how to use this medication.  He does also have a change in his voice when he moves his head back and also feels that there is something in his throat, will refer patient to ENT.  Follow-up with neurology for tremors.  Blood pressure stable today 126/78, continue amlodipine and lisinopril.  Continue rosuvastatin for elevated cholesterols.  RTC    1. Mixed hyperlipidemia    2. Primary hypertension    3. Stage 2 chronic kidney disease    4. Claustrophobia  -     diazepam (VALIUM) 5 mg tablet; Take 1 tablet one hour before MRI, may take 1 dose just prior to exam if needed    5. Change in voice  -     Ambulatory Referral to Otolaryngology; Future    6. Tremors of nervous system           Subjective      He presents today to discuss shortness of breath during MRI recently and felt like he had to get out of the machine.  No known history of claustrophobia.  States that he also has had random shortness of breath episodes even while sitting at home.  States that he has a change in voice when he lays down and moves his head back.  Follows up with neurology for tremors.  Doing well with his medications otherwise.    Breathing Problem  He complains of difficulty breathing and shortness of breath. There is no cough or wheezing. Pertinent negatives include no appetite change, chest pain, fever, headaches, myalgias, rhinorrhea, sneezing or sore throat.     Review of Systems   Constitutional:  Negative for activity change, appetite change, chills, fatigue and fever.   HENT:  Positive for voice change. Negative for congestion, rhinorrhea,  "sneezing and sore throat.    Eyes:  Negative for pain, discharge, redness and itching.   Respiratory:  Positive for shortness of breath. Negative for cough, chest tightness and wheezing.    Cardiovascular:  Negative for chest pain and palpitations.   Gastrointestinal:  Negative for abdominal pain, constipation, diarrhea, nausea and vomiting.   Musculoskeletal:  Negative for arthralgias, gait problem, myalgias and neck pain.   Skin:  Negative for rash.   Neurological:  Positive for tremors. Negative for dizziness, weakness, numbness and headaches.   Hematological:  Negative for adenopathy.   Psychiatric/Behavioral:  The patient is nervous/anxious.    All other systems reviewed and are negative.      Current Outpatient Medications on File Prior to Visit   Medication Sig   • amLODIPine (NORVASC) 10 mg tablet TAKE 1 TABLET BY MOUTH EVERY DAY   • lisinopril (ZESTRIL) 40 mg tablet TAKE 1 TABLET BY MOUTH EVERY DAY   • rosuvastatin (CRESTOR) 10 MG tablet Take 1 tablet (10 mg total) by mouth daily       Objective     /78   Pulse 73   Temp 97.5 °F (36.4 °C) (Temporal)   Ht 5' 11\" (1.803 m)   Wt 78.6 kg (173 lb 3.2 oz)   SpO2 98%   BMI 24.16 kg/m²     Physical Exam  Vitals reviewed.   Constitutional:       General: He is not in acute distress.     Appearance: Normal appearance. He is well-developed. He is not toxic-appearing or diaphoretic.   HENT:      Head: Normocephalic and atraumatic.      Right Ear: External ear normal.      Left Ear: External ear normal.      Nose: Nose normal.      Mouth/Throat:      Pharynx: No oropharyngeal exudate.   Eyes:      General: No scleral icterus.        Right eye: No discharge.         Left eye: No discharge.      Conjunctiva/sclera: Conjunctivae normal.   Cardiovascular:      Rate and Rhythm: Normal rate and regular rhythm.      Pulses: Normal pulses.      Heart sounds: Normal heart sounds. No murmur heard.  Pulmonary:      Effort: Pulmonary effort is normal. No respiratory " distress.      Breath sounds: Normal breath sounds. No wheezing.   Abdominal:      General: Bowel sounds are normal. There is no distension.      Palpations: Abdomen is soft.      Tenderness: There is no abdominal tenderness.   Musculoskeletal:         General: No swelling, tenderness, deformity or signs of injury. Normal range of motion.      Cervical back: Normal range of motion.   Skin:     General: Skin is warm.      Findings: No erythema or rash.   Neurological:      General: No focal deficit present.      Mental Status: He is alert.      Cranial Nerves: No cranial nerve deficit.      Motor: No weakness.      Gait: Gait normal.   Psychiatric:         Mood and Affect: Mood normal.         Behavior: Behavior normal.       Katerina Pimentel MD

## 2024-03-05 NOTE — TELEPHONE ENCOUNTER
Marya called from [facility] in regards to the JUDY scan. (Patient's son had called and spoke with her) Patient is claustrophobic and would need sedation.  She is questioning whether the MRI and JUDY scan could be coordinated at the same time while under sedation.  (MRI order has been closed due to claustrophobia)     Addendum: PCP placed prescription for 2 pills of Valium.    Please advise.  Thanks

## 2024-03-09 ENCOUNTER — TELEPHONE (OUTPATIENT)
Dept: OTHER | Facility: HOSPITAL | Age: 74
End: 2024-03-09

## 2024-03-09 DIAGNOSIS — R25.1 TREMORS OF NERVOUS SYSTEM: Primary | ICD-10-CM

## 2024-03-09 NOTE — TELEPHONE ENCOUNTER
Reached out to the patient's son in regards to his question about the Valium 5 mg prior to the imaging studies that were ordered.  Patient's PCP prescribed Valium 1 tablet to be taken 1 hour prior and then 1 tablet during if needed.  Patient's son was a little apprehensive about that because of the dosing.  I agree that it should be okay if the patient were to take 1 Valium for the MRI study and 1 Valium for the DaTscan.  I did advise the son that he should do 1 study at a time so that way he is able to monitor how the patient does with the Valium and if he needs anything else during the study in regards to anxiolytic.    Patient's son also stated that he needs new scripts for the MRI and the DaTscan due to the authorization being  at this time.  I placed new orders for the imaging studies.    I advised the patient to give us a call back if there were any other questions or concerns.

## 2024-03-23 ENCOUNTER — HOSPITAL ENCOUNTER (OUTPATIENT)
Facility: MEDICAL CENTER | Age: 74
Discharge: HOME | End: 2024-03-23
Payer: COMMERCIAL

## 2024-03-23 DIAGNOSIS — R25.1 TREMORS OF NERVOUS SYSTEM: ICD-10-CM

## 2024-03-23 PROCEDURE — 70553 MRI BRAIN STEM W/O & W/DYE: CPT

## 2024-03-23 PROCEDURE — A9585 GADOBUTROL INJECTION: HCPCS

## 2024-03-23 RX ORDER — GADOBUTROL 604.72 MG/ML
7 INJECTION INTRAVENOUS
Status: COMPLETED | OUTPATIENT
Start: 2024-03-23 | End: 2024-03-23

## 2024-03-23 RX ADMIN — GADOBUTROL 7 ML: 604.72 INJECTION INTRAVENOUS at 15:32

## 2024-03-29 ENCOUNTER — TELEPHONE (OUTPATIENT)
Dept: NEUROLOGY | Facility: CLINIC | Age: 74
End: 2024-03-29

## 2024-03-29 ENCOUNTER — TELEPHONE (OUTPATIENT)
Age: 74
End: 2024-03-29

## 2024-03-29 NOTE — TELEPHONE ENCOUNTER
Patient called the RX Refill Line. Message is being forwarded to the office.     Patient's son called to let Katernia Pimentel MD know that the valium he prescribed for his fathers MRI worked well. He is going to be scheduling a JUDY scan that was ordered by his neurologist and would like to know if the valium can be reordered for that scan. Please contact Patients son if there are any issues reordering this for his father    Please contact patient son at [phone]

## 2024-03-29 NOTE — TELEPHONE ENCOUNTER
Patient's son calling to confirm MRI came back and he is okay to have JUDY scan.  Please assist and call.

## 2024-04-01 ENCOUNTER — TELEPHONE (OUTPATIENT)
Dept: FAMILY MEDICINE CLINIC | Facility: CLINIC | Age: 74
End: 2024-04-01

## 2024-04-01 DIAGNOSIS — F40.240 CLAUSTROPHOBIA: ICD-10-CM

## 2024-04-01 RX ORDER — DIAZEPAM 5 MG/1
TABLET ORAL
Qty: 2 TABLET | Refills: 0 | Status: SHIPPED | OUTPATIENT
Start: 2024-04-01

## 2024-04-01 NOTE — TELEPHONE ENCOUNTER
Patient needs a medication prior authorization started for Diazepam 5 mg. Tablets per [facility] at [address] [place] [address], [place] [address].  I did scan the request under Media.

## 2024-04-03 ENCOUNTER — TELEPHONE (OUTPATIENT)
Dept: FAMILY MEDICINE CLINIC | Facility: CLINIC | Age: 74
End: 2024-04-03

## 2024-04-03 NOTE — TELEPHONE ENCOUNTER
Received a Cover My Meds Medication followup for Diazepam 5 mg. Tabs.  Scanned the paper into the patient's chart.

## 2024-04-04 NOTE — TELEPHONE ENCOUNTER
PA for diazepam (VALIUM) 5 mg tablet     Submitted via    []CMM-KEY   [x][employer]-Case ID # [id number]   []Faxed to plan   []Other website   []Phone call Case ID #     Office notes sent, clinical questions answered. Awaiting determination    Turnaround time for your insurance to make a decision on your Prior Authorization can take 7-21 business days.

## 2024-04-05 NOTE — TELEPHONE ENCOUNTER
PA for  diazepam (VALIUM) 5 mg tablet  Approved   Date(s) approved 1/1/24-5/4/24  Case #    Patient advised by [x] [employer] Message                      [x] Phone call       Pharmacy advised by [x]Fax                                     []Phone call    Approval letter scanned into Media Yes

## 2024-04-08 DIAGNOSIS — E78.2 MIXED HYPERLIPIDEMIA: ICD-10-CM

## 2024-04-08 DIAGNOSIS — I10 PRIMARY HYPERTENSION: ICD-10-CM

## 2024-04-08 DIAGNOSIS — I16.0 HYPERTENSIVE URGENCY: ICD-10-CM

## 2024-04-08 RX ORDER — ROSUVASTATIN CALCIUM 10 MG/1
10 TABLET, COATED ORAL DAILY
Qty: 90 TABLET | Refills: 1 | Status: SHIPPED | OUTPATIENT
Start: 2024-04-08

## 2024-04-08 RX ORDER — LISINOPRIL 40 MG/1
TABLET ORAL
Qty: 90 TABLET | Refills: 1 | Status: SHIPPED | OUTPATIENT
Start: 2024-04-08

## 2024-04-08 RX ORDER — AMLODIPINE BESYLATE 10 MG/1
10 TABLET ORAL DAILY
Qty: 90 TABLET | Refills: 1 | Status: SHIPPED | OUTPATIENT
Start: 2024-04-08

## 2024-04-13 ENCOUNTER — TELEPHONE (OUTPATIENT)
Dept: OTHER | Facility: HOSPITAL | Age: 74
End: 2024-04-13

## 2024-04-13 NOTE — TELEPHONE ENCOUNTER
I contacted the son in regards to the MRI results but he did not receive my call. I will let the nursing team attempt to call him. I informed the nursing team of the information that I would like to provide the patient's son. I did answer his question about the JUDY scan and stated that the MRI and JUDY scan are independent of each other.

## 2024-04-26 ENCOUNTER — TELEPHONE (OUTPATIENT)
Dept: NEUROLOGY | Facility: CLINIC | Age: 74
End: 2024-04-26

## 2024-04-29 ENCOUNTER — HOSPITAL ENCOUNTER (OUTPATIENT)
Dept: RADIOLOGY | Facility: HOSPITAL | Age: 74
Discharge: HOME | End: 2024-04-29
Payer: COMMERCIAL

## 2024-04-29 DIAGNOSIS — R25.1 TREMORS OF NERVOUS SYSTEM: ICD-10-CM

## 2024-04-29 PROCEDURE — 78803 RP LOCLZJ TUM SPECT 1 AREA: CPT

## 2024-04-29 PROCEDURE — A9584 IODINE I-123 IOFLUPANE: HCPCS

## 2024-04-29 RX ADMIN — POTASSIUM IODIDE 130 MG: 1 SOLUTION ORAL at 07:47

## 2024-04-30 ENCOUNTER — OFFICE VISIT (OUTPATIENT)
Dept: NEUROLOGY | Facility: CLINIC | Age: 74
End: 2024-04-30
Payer: COMMERCIAL

## 2024-04-30 VITALS
BODY MASS INDEX: 22.82 KG/M2 | DIASTOLIC BLOOD PRESSURE: 82 MMHG | SYSTOLIC BLOOD PRESSURE: 122 MMHG | HEART RATE: 81 BPM | HEIGHT: 71 IN | WEIGHT: 163 LBS | TEMPERATURE: 97.6 F

## 2024-04-30 DIAGNOSIS — G20.C PARKINSONISM: ICD-10-CM

## 2024-04-30 DIAGNOSIS — I63.9 STROKE (HCC): ICD-10-CM

## 2024-04-30 DIAGNOSIS — R53.81 PHYSICAL DECONDITIONING: ICD-10-CM

## 2024-04-30 DIAGNOSIS — R25.1 TREMORS OF NERVOUS SYSTEM: Primary | ICD-10-CM

## 2024-04-30 DIAGNOSIS — E53.8 DEFICIENCY OF OTHER SPECIFIED B GROUP VITAMINS: ICD-10-CM

## 2024-04-30 DIAGNOSIS — R60.9 EDEMA, UNSPECIFIED: ICD-10-CM

## 2024-04-30 DIAGNOSIS — M79.89 SWELLING OF HAND: ICD-10-CM

## 2024-04-30 PROCEDURE — 99215 OFFICE O/P EST HI 40 MIN: CPT | Performed by: PSYCHIATRY & NEUROLOGY

## 2024-04-30 RX ORDER — ASPIRIN 81 MG/1
81 TABLET, CHEWABLE ORAL DAILY
Qty: 30 TABLET | Refills: 2 | Status: SHIPPED | OUTPATIENT
Start: 2024-04-30 | End: 2024-07-29

## 2024-04-30 NOTE — PROGRESS NOTES
Patient ID: Edy Strickland is a 73 y.o. male.    Assessment:    Diagnoses and all orders for this visit:    Tremors of nervous system, Parkinsonism, Physical deconditioning, Deficiency of other specified B group vitamins    Patient is a 73-year-old male presenting to the clinic today in regards to a follow-up for his tremors.  Patient has a past medical history of fatty liver, hypertension, hypokalemia, orthopnea, elevated glucose, hyperlipidemia, and smoking.     Patient's neurological examination is stable in comparison to the previous examination except for noticing some bradykinesia along with hypomimia and hypophonia.  I explained all the results of the MRI along with the DATscan to the patient and his son who is accompanying him.  We offered to initiate the patient on Sinemet but patient did not want to do that at the moment.  Patient also refused to initiate physical therapy.    Patient stated that this is a lot for him to think about and would like to discuss this with his son and make a decision after.    Work-Up:  - MRI Brain w wo contrast 3/23/24: No mass effect, acute intracranial hemorrhage or evidence of recent infarction. Mild to moderate chronic microvascular ischemic change. No abnormal parenchymal or leptomeningeal enhancement identified. Tiny posterior gangliocapsular lacunar infarcts with otherwise no significant signal abnormality involving the basal ganglia as clinically questioned.  - NM Brain JUDY scan w/Rx 4/29/24: Abnormal radiotracer distribution. 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     Plan:  Recommended that we initiate the patient on Carbidopa-Levodopa  0.5 tablet 3x daily   Patient stated that he would like to think about this  Recommended that patient do physical therapy to help with his movements but patient said he would like to think about this as well  Vitamin B12 level ordered  All questions addressed and patient verbalized understanding  Monitor for  any worsening symptoms  Call of any questions or concerns      Stroke (HCC)    Patient's MRI showed that there were tiny posterior gangliocapsular lacunar infarcts.  Given that, patient is an appropriate candidate for starting aspirin 81 mg for stroke prevention.  At this time, patient is already on Crestor 10 mg but patient's LDL was 133 from last year.  At this time patient can remain on this but we will repeat a lipid panel and determine further.    Plan:  For stroke prevention continue with: Aspirin 81 mg  BP goal < 130/80. At goal in office.  LDL goal <70  Patient on Crestor 10 mg.  Patient can remain on this until we get another lipid panel repeated.  Recommend mediterranean diet & regular exercise regimen atleast 4-5 times a week for 20-30 minutes.       Swelling of hand, Edema (unspecified)    There is noted swelling on patient's right arm.  Patient was unable to tell us anything in regards to when this all initiated.  Will send the patient for a venous duplex scan to evaluate further.    Plan:  VAS upper limb venous duplex scan for R hand      The patient indicates understanding of these issues and agrees with the plan.     Return in about 3 months (around 7/30/2024).     This patient case was discussed with Dr. Sharmaine Loera.       Subjective:    HPI    Patient is a 73-year-old male presenting to the clinic today in regards to a follow-up for his tremors.  Patient has a past medical history of fatty liver, hypertension, hypokalemia, orthopnea, elevated glucose, hyperlipidemia, and smoking.  Patient was last seen in the office on 1/23/24 by Dr. Be and myself. Today, patient is accompanied by his son.     Since the last time the patient was seen in the office, the patient's son had reached out to me multiple times in regards to patient not being able to tolerate small spaces and was unable to tolerate his scans.  Getting an antianxiolytic, patient was able to get through both of his scans.    At this time,  patient's tremors are worse. He describes symptoms of unilateral hand tremor, voice change, and sleep disturbance.Voice changes include decreased volume noted.  The patient denies bilateral hand tremor, drooling during sleep (wet pillows), change in handwriting, difficulty with walking, difficult with posture, difficulty with swallowing, and balance problems.  He also describes autonomic symptoms of dry mouth and he denies orthostasis, nausea, diarrhea, constipation, and dry eyes.     Patient does state he has had swelling in his right hand but is unable to tell us when it started.    There are no other questions or complaints at this time.    The following portions of the patient's history were reviewed and updated as appropriate: He  has a past medical history of Hypertension and Liver disease.     Patient Active Problem List    Diagnosis Date Noted    Stage 2 chronic kidney disease 03/01/2024    Tremors of nervous system 03/01/2024    Fatty liver 04/12/2023    Elevated glucose 04/12/2023    Hypertension 04/08/2023    Hypokalemia 04/08/2023    Orthopnea 04/08/2023    Abdominal wall asymmetry 04/15/2021    Pure hypercholesterolemia 04/15/2021    Mixed hyperlipidemia 12/03/2020    Pulmonary nodule 11/12/2020    Nocturnal polyuria 01/17/2020    Erectile dysfunction following prostate ablative therapy 05/07/2019    Benign prostatic hyperplasia with lower urinary tract symptoms 12/18/2018     He  has a past surgical history that includes Prostate surgery.  His family history is not on file.  He  reports that he quit smoking about 4 years ago. His smoking use included cigarettes. He has never used smokeless tobacco. He reports that he does not currently use alcohol. He reports that he does not currently use drugs.    Current Outpatient Medications   Medication Sig Dispense Refill    amLODIPine (NORVASC) 10 mg tablet TAKE 1 TABLET BY MOUTH EVERY DAY 90 tablet 1    aspirin 81 mg chewable tablet Chew 1 tablet (81 mg total)  "daily 30 tablet 2    lisinopril (ZESTRIL) 40 mg tablet TAKE 1 TABLET BY MOUTH EVERY DAY 90 tablet 1    rosuvastatin (CRESTOR) 10 MG tablet TAKE 1 TABLET BY MOUTH EVERY DAY 90 tablet 1    diazepam (VALIUM) 5 mg tablet Take 1 tablet one hour before MRI, may take 1 dose just prior to exam if needed 2 tablet 0     No current facility-administered medications for this visit.     Current Outpatient Medications on File Prior to Visit   Medication Sig    amLODIPine (NORVASC) 10 mg tablet TAKE 1 TABLET BY MOUTH EVERY DAY    lisinopril (ZESTRIL) 40 mg tablet TAKE 1 TABLET BY MOUTH EVERY DAY    rosuvastatin (CRESTOR) 10 MG tablet TAKE 1 TABLET BY MOUTH EVERY DAY    diazepam (VALIUM) 5 mg tablet Take 1 tablet one hour before MRI, may take 1 dose just prior to exam if needed     No current facility-administered medications on file prior to visit.     He has No Known Allergies..         Objective:    Blood pressure 122/82, pulse 81, temperature 97.6 °F (36.4 °C), height 5' 11\" (1.803 m), weight 73.9 kg (163 lb).    Physical Exam  Vitals reviewed.   Constitutional:       Appearance: Normal appearance.   HENT:      Head: Normocephalic and atraumatic.      Mouth/Throat:      Mouth: Mucous membranes are moist.   Eyes:      General: Lids are normal.      Extraocular Movements: Extraocular movements intact.      Conjunctiva/sclera: Conjunctivae normal.      Pupils: Pupils are equal, round, and reactive to light.   Cardiovascular:      Rate and Rhythm: Normal rate.   Pulmonary:      Effort: Pulmonary effort is normal.   Musculoskeletal:         General: Swelling (In the R upper extremity) present. Normal range of motion.   Skin:     General: Skin is warm.   Neurological:      Mental Status: He is alert.      Motor: Motor strength is normal.     Coordination: Romberg sign negative.      Deep Tendon Reflexes:      Reflex Scores:       Tricep reflexes are 2+ on the right side and 2+ on the left side.       Bicep reflexes are 2+ on the " right side and 2+ on the left side.       Brachioradialis reflexes are 2+ on the right side and 2+ on the left side.       Patellar reflexes are 2+ on the right side and 2+ on the left side.       Achilles reflexes are 2+ on the right side and 2+ on the left side.  Psychiatric:         Mood and Affect: Mood normal.         Speech: Speech normal.         Behavior: Behavior normal.         Neurological Exam  Mental Status  Alert. Oriented to person, place and time. Speech is normal. Language is fluent with no aphasia.    Cranial Nerves  CN II: Visual acuity is normal. Visual fields full to confrontation.  CN III, IV, VI: Extraocular movements intact bilaterally. Normal lids and orbits bilaterally. Pupils equal round and reactive to light bilaterally.  CN V: Facial sensation is normal.  CN VII: Full and symmetric facial movement.  CN VIII: Hearing is normal.  CN IX, X: Palate elevates symmetrically. Normal gag reflex.  CN XI: Shoulder shrug strength is normal.  CN XII: Tongue midline without atrophy or fasciculations.    Motor  Normal muscle bulk throughout. No fasciculations present. Increased muscle tone. Increased tone in bilateral upper extremities with cogwheel rigidity. Worse on R. The following abnormal movements were seen: Tremors observed in b/l hands. L worse at times compared to the R.   Strength is 5/5 throughout all four extremities.  Slowed movements seen.    Sensory  Light touch is normal in upper and lower extremities.     Reflexes                                            Right                      Left  Brachioradialis                    2+                         2+  Biceps                                 2+                         2+  Triceps                                2+                         2+  Patellar                                2+                         2+  Achilles                                2+                         2+    Coordination  Right: Finger-to-nose normal. Rapid  alternating movement abnormality: Slowed movements.Left: Finger-to-nose normal. Rapid alternating movement normal.    Gait  Casual gait: Normal stance. Normal stride length. Reduced right arm swing. Worse compared to L. Reduced left arm swing.Normal toe walking. Normal heel walking. Romberg is absent. Normal pull test.        ROS:    Review of Systems   Neurological:  Positive for tremors. Negative for dizziness, seizures, syncope, facial asymmetry, speech difficulty, weakness, light-headedness, numbness and headaches.

## 2024-05-23 DIAGNOSIS — I63.9 STROKE (HCC): ICD-10-CM

## 2024-05-27 RX ORDER — ASPIRIN 81 MG
81 TABLET,CHEWABLE ORAL DAILY
Qty: 90 TABLET | Refills: 1 | Status: SHIPPED | OUTPATIENT
Start: 2024-05-27

## 2024-06-28 ENCOUNTER — RA CDI HCC (OUTPATIENT)
Dept: OTHER | Facility: HOSPITAL | Age: 74
End: 2024-06-28

## 2024-07-08 ENCOUNTER — OFFICE VISIT (OUTPATIENT)
Dept: FAMILY MEDICINE CLINIC | Facility: CLINIC | Age: 74
End: 2024-07-08
Payer: COMMERCIAL

## 2024-07-08 VITALS
WEIGHT: 158 LBS | OXYGEN SATURATION: 99 % | BODY MASS INDEX: 22.12 KG/M2 | TEMPERATURE: 97.6 F | SYSTOLIC BLOOD PRESSURE: 138 MMHG | DIASTOLIC BLOOD PRESSURE: 78 MMHG | HEART RATE: 87 BPM | HEIGHT: 71 IN

## 2024-07-08 DIAGNOSIS — E78.00 PURE HYPERCHOLESTEROLEMIA: ICD-10-CM

## 2024-07-08 DIAGNOSIS — Z12.5 SCREENING FOR PROSTATE CANCER: ICD-10-CM

## 2024-07-08 DIAGNOSIS — R73.03 PREDIABETES: Primary | ICD-10-CM

## 2024-07-08 DIAGNOSIS — N18.2 STAGE 2 CHRONIC KIDNEY DISEASE: ICD-10-CM

## 2024-07-08 DIAGNOSIS — I10 PRIMARY HYPERTENSION: ICD-10-CM

## 2024-07-08 DIAGNOSIS — E78.2 MIXED HYPERLIPIDEMIA: ICD-10-CM

## 2024-07-08 DIAGNOSIS — Z00.00 MEDICARE ANNUAL WELLNESS VISIT, SUBSEQUENT: ICD-10-CM

## 2024-07-08 DIAGNOSIS — R25.1 TREMORS OF NERVOUS SYSTEM: ICD-10-CM

## 2024-07-08 PROCEDURE — 99214 OFFICE O/P EST MOD 30 MIN: CPT | Performed by: FAMILY MEDICINE

## 2024-07-08 PROCEDURE — G0439 PPPS, SUBSEQ VISIT: HCPCS | Performed by: FAMILY MEDICINE

## 2024-07-08 NOTE — PROGRESS NOTES
Ambulatory Visit  Name: Edy Strickland      : 1950      MRN: [id number]  Encounter Provider: Katerina Pimentel MD  Encounter Date: 2024   Encounter department: [facility] PRIMARY [facility]ARE    Assessment & Plan     Medicare annual wellness completed today.  Overdue for labs, orders placed as below.  States he has been eating a lot of sugar.  Last A1c was 6.4.  Will check a PSA total screen, this was ordered by urology in the past however patient did not have this done.  Continue statin for hyperlipidemia.  Avoid nephrotoxins due to stage II CKD.  Follow-up with neurology for history of tremors, has not started carbidopa-levodopa as recommended.  Blood pressure slightly elevated today 138/78, continue amlodipine 10 mg as well as lisinopril 40 mg check blood pressures at home.  Refuses colon cancer screening.  RTC in 6 months for follow-up    1. Prediabetes  -     Comprehensive metabolic panel; Future  -     Hemoglobin A1C; Future  2. Screening for prostate cancer  -     PSA, Total Screen; Future  3. Medicare annual wellness visit, subsequent  4. Mixed hyperlipidemia  5. Stage 2 chronic kidney disease  6. Tremors of nervous system  7. Pure hypercholesterolemia  8. Primary hypertension     Preventive health issues were discussed with patient, and age appropriate screening tests were ordered as noted in patient's After Visit Summary. Personalized health advice and appropriate referrals for health education or preventive services given if needed, as noted in patient's After Visit Summary.    History of Present Illness     He presents today for follow-up on prediabetes, hyperlipidemia, stage II CKD, tremors.  He has been following up with neurology.  Recommendations were to start levodopa carbidopa, states he is not made a decision on starting this medication yet.  Declines colon cancer screening       Patient Care Team:  Katerina Pimentel MD as PCP - General (Family Medicine)    Review of Systems    Constitutional:  Negative for activity change, appetite change, chills, fatigue and fever.   HENT:  Negative for congestion, rhinorrhea, sneezing and sore throat.    Eyes:  Negative for pain, discharge, redness and itching.   Respiratory:  Negative for cough, chest tightness, shortness of breath and wheezing.    Cardiovascular:  Negative for chest pain and palpitations.   Gastrointestinal:  Negative for abdominal pain, constipation, diarrhea, nausea and vomiting.   Musculoskeletal:  Negative for arthralgias, gait problem, myalgias and neck pain.   Skin:  Negative for rash.   Neurological:  Positive for tremors. Negative for dizziness, weakness, numbness and headaches.   Hematological:  Negative for adenopathy.   Psychiatric/Behavioral:  Negative for confusion and dysphoric mood. The patient is not nervous/anxious.    All other systems reviewed and are negative.    Medical History Reviewed by provider this encounter:  Tobacco  Allergies  Meds  Problems  Med Hx  Surg Hx  Fam Hx       Annual Wellness Visit Questionnaire   Edy is here for his Subsequent Wellness visit. Last Medicare Wellness visit information reviewed, patient interviewed and updates made to the record today.      Health Risk Assessment:   Patient rates overall health as fair. Patient feels that their physical health rating is same. Patient is satisfied with their life. Eyesight was rated as slightly worse. Hearing was rated as same. Patient feels that their emotional and mental health rating is same. Patients states they are never, rarely angry. Patient states they are never, rarely unusually tired/fatigued. Pain experienced in the last 7 days has been none. Patient states that he has experienced weight loss or gain in last 6 months.     Depression Screening:   PHQ-2 Score: 0      Fall Risk Screening:   In the past year, patient has experienced: no history of falling in past year      Home Safety:  Patient does not have trouble with stairs  inside or outside of their home. Patient has working smoke alarms and has working carbon monoxide detector. Home safety hazards include: none.     Nutrition:   Current diet is Regular.     Medications:   Patient is currently taking over-the-counter supplements. OTC medications include: see medication list. Patient is able to manage medications.     Activities of Daily Living (ADLs)/Instrumental Activities of Daily Living (IADLs):   Walk and transfer into and out of bed and chair?: Yes  Dress and groom yourself?: Yes    Bathe or shower yourself?: Yes    Feed yourself? Yes  Do your laundry/housekeeping?: Yes  Manage your money, pay your bills and track your expenses?: Yes  Make your own meals?: Yes    Do your own shopping?: Yes    Previous Hospitalizations:   Any hospitalizations or ED visits within the last 12 months?: No      Advance Care Planning:   Living will: No    Durable POA for healthcare: No    Advanced directive: No      PREVENTIVE SCREENINGS      Cardiovascular Screening:    General: History Lipid Disorder and Risks and Benefits Discussed    Due for: Lipid Panel      Diabetes Screening:     General: Risks and Benefits Discussed    Due for: Blood Glucose      Colorectal Cancer Screening:     General: Risks and Benefits Discussed    Due for: Colonoscopy - High Risk      Prostate Cancer Screening:    General: Risks and Benefits Discussed    Due for: PSA      Osteoporosis Screening:    General: Screening Not Indicated      Abdominal Aortic Aneurysm (AAA) Screening:    Risk factors include: age between 65-74 yo and tobacco use        General: Risks and Benefits Discussed    Due for: Screening AAA Ultrasound      Lung Cancer Screening:     General: Screening Not Indicated      Hepatitis C Screening:    General: Screening Current    Screening, Brief Intervention, and Referral to Treatment (SBIRT)    Screening      Single Item Drug Screening:  How often have you used an illegal drug (including marijuana) or a  "prescription medication for non-medical reasons in the past year? never    Single Item Drug Screen Score: 0  Interpretation: Negative screen for possible drug use disorder    Social Determinants of Health     Food Insecurity: No Food Insecurity (7/7/2024)    Hunger Vital Sign    • Worried About Running Out of Food in the Last Year: Never true    • Ran Out of Food in the Last Year: Never true   Transportation Needs: No Transportation Needs (7/7/2024)    PRAPARE - Transportation    • Lack of Transportation (Medical): No    • Lack of Transportation (Non-Medical): No   Housing Stability: Low Risk  (7/7/2024)    Housing Stability Vital Sign    • Unable to Pay for Housing in the Last Year: No    • Number of Times Moved in the Last Year: 0    • Homeless in the Last Year: No   Utilities: Not At Risk (7/7/2024)    [facility] Utilities    • Threatened with loss of utilities: No     No results found.    Objective     /78   Pulse 87   Temp 97.6 °F (36.4 °C)   Ht 5' 11\" (1.803 m)   Wt 71.7 kg (158 lb)   SpO2 99%   BMI 22.04 kg/m²     Physical Exam  Vitals reviewed.   Constitutional:       General: He is not in acute distress.     Appearance: Normal appearance. He is well-developed. He is not ill-appearing or toxic-appearing.   HENT:      Head: Normocephalic and atraumatic.      Right Ear: Tympanic membrane, ear canal and external ear normal. There is no impacted cerumen.      Left Ear: Tympanic membrane, ear canal and external ear normal. There is no impacted cerumen.      Nose: Nose normal. No congestion or rhinorrhea.      Mouth/Throat:      Mouth: Mucous membranes are moist.      Pharynx: Oropharynx is clear. No oropharyngeal exudate or posterior oropharyngeal erythema.   Eyes:      General: No scleral icterus.        Right eye: No discharge.         Left eye: No discharge.      Conjunctiva/sclera: Conjunctivae normal.      Pupils: Pupils are equal, round, and reactive to light.   Cardiovascular:      Rate and Rhythm: " Normal rate and regular rhythm.      Pulses: Normal pulses.      Heart sounds: Normal heart sounds. No murmur heard.  Pulmonary:      Effort: Pulmonary effort is normal. No respiratory distress.      Breath sounds: Normal breath sounds.   Abdominal:      General: Abdomen is flat. There is no distension.      Palpations: Abdomen is soft. There is no mass.      Tenderness: There is no abdominal tenderness.      Hernia: No hernia is present.   Musculoskeletal:         General: No tenderness. Normal range of motion.      Cervical back: Normal range of motion.   Skin:     General: Skin is warm and dry.      Capillary Refill: Capillary refill takes less than 2 seconds.      Findings: No rash.   Neurological:      General: No focal deficit present.      Mental Status: He is alert.      Motor: No weakness.   Psychiatric:         Mood and Affect: Mood normal.         Behavior: Behavior normal.       Administrative Statements

## 2024-07-08 NOTE — PATIENT INSTRUCTIONS
Medicare Preventive Visit Patient Instructions  Thank you for completing your Welcome to Medicare Visit or Medicare Annual Wellness Visit today. Your next wellness visit will be due in one year (7/9/2025).  The screening/preventive services that you may require over the next 5-10 years are detailed below. Some tests may not apply to you based off risk factors and/or age. Screening tests ordered at today's visit but not completed yet may show as past due. Also, please note that scanned in results may not display below.  Preventive Screenings:  Service Recommendations Previous Testing/Comments   Colorectal Cancer Screening  Colonoscopy    Fecal Occult Blood Test (FOBT)/Fecal Immunochemical Test (FIT)  Fecal DNA/Cologuard Test  Flexible Sigmoidoscopy Age: 45-75 years old   Colonoscopy: every 10 years (May be performed more frequently if at higher risk)  OR  FOBT/FIT: every 1 year  OR  Cologuard: every 3 years  OR  Sigmoidoscopy: every 5 years  Screening may be recommended earlier than age 45 if at higher risk for colorectal cancer. Also, an individualized decision between you and your healthcare provider will decide whether screening between the ages of 76-85 would be appropriate. Colonoscopy: Not on file  FOBT/FIT: Not on file  Cologuard: Not on file  Sigmoidoscopy: Not on file          Prostate Cancer Screening Individualized decision between patient and health care provider in men between ages of 55-69   Medicare will cover every 12 months beginning on the day after your 50th birthday PSA: No results in last 5 years           Hepatitis C Screening Once for adults born between 1945 and 1965  More frequently in patients at high risk for Hepatitis C Hep C Antibody: 06/08/2023    Screening Current   Diabetes Screening 1-2 times per year if you're at risk for diabetes or have pre-diabetes Fasting glucose: 150 mg/dL (6/8/2023)  A1C: 6.4 % (6/14/2023)      Cholesterol Screening Once every 5 years if you don't have a lipid  disorder. May order more often based on risk factors. Lipid panel: 06/08/2023  Screening Not Indicated  History Lipid Disorder      Other Preventive Screenings Covered by Medicare:  Abdominal Aortic Aneurysm (AAA) Screening: covered once if your at risk. You're considered to be at risk if you have a family history of AAA or a male between the age of 65-75 who smoking at least 100 cigarettes in your lifetime.  Lung Cancer Screening: covers low dose CT scan once per year if you meet all of the following conditions: (1) Age 55-77; (2) No signs or symptoms of lung cancer; (3) Current smoker or have quit smoking within the last 15 years; (4) You have a tobacco smoking history of at least 20 pack years (packs per day x number of years you smoked); (5) You get a written order from a healthcare provider.  Glaucoma Screening: covered annually if you're considered high risk: (1) You have diabetes OR (2) Family history of glaucoma OR (3)  aged 50 and older OR (4)  American aged 65 and older  Osteoporosis Screening: covered every 2 years if you meet one of the following conditions: (1) Have a vertebral abnormality; (2) On glucocorticoid therapy for more than 3 months; (3) Have primary hyperparathyroidism; (4) On osteoporosis medications and need to assess response to drug therapy.  HIV Screening: covered annually if you're between the age of 15-65. Also covered annually if you are younger than 15 and older than 65 with risk factors for HIV infection. For pregnant patients, it is covered up to 3 times per pregnancy.    Immunizations:  Immunization Recommendations   Influenza Vaccine Annual influenza vaccination during flu season is recommended for all persons aged >= 6 months who do not have contraindications   Pneumococcal Vaccine   * Pneumococcal conjugate vaccine = PCV13 (Prevnar 13), PCV15 (Vaxneuvance), PCV20 (Prevnar 20)  * Pneumococcal polysaccharide vaccine = PPSV23 (Pneumovax) Adults 19-63 yo  with certain risk factors or if 65+ yo  If never received any pneumonia vaccine: recommend Prevnar 20 (PCV20)  Give PCV20 if previously received 1 dose of PCV13 or PPSV23   Hepatitis B Vaccine 3 dose series if at intermediate or high risk (ex: diabetes, end stage renal disease, liver disease)   Respiratory syncytial virus (RSV) Vaccine - COVERED BY MEDICARE PART D  * RSVPreF3 (Arexvy) CDC recommends that adults 60 years of age and older may receive a single dose of RSV vaccine using shared clinical decision-making (SCDM)   Tetanus (Td) Vaccine - COST NOT COVERED BY MEDICARE PART B Following completion of primary series, a booster dose should be given every 10 years to maintain immunity against tetanus. Td may also be given as tetanus wound prophylaxis.   Tdap Vaccine - COST NOT COVERED BY MEDICARE PART B Recommended at least once for all adults. For pregnant patients, recommended with each pregnancy.   Shingles Vaccine (Shingrix) - COST NOT COVERED BY MEDICARE PART B  2 shot series recommended in those 19 years and older who have or will have weakened immune systems or those 50 years and older     Health Maintenance Due:      Topic Date Due   • Colorectal Cancer Screening  Never done   • Hepatitis C Screening  Completed     Immunizations Due:      Topic Date Due   • Influenza Vaccine (1) 09/01/2024     Advance Directives   What are advance directives?  Advance directives are legal documents that state your wishes and plans for medical care. These plans are made ahead of time in case you lose your ability to make decisions for yourself. Advance directives can apply to any medical decision, such as the treatments you want, and if you want to donate organs.   What are the types of advance directives?  There are many types of advance directives, and each state has rules about how to use them. You may choose a combination of any of the following:  Living will:  This is a written record of the treatment you want. You can  also choose which treatments you do not want, which to limit, and which to stop at a certain time. This includes surgery, medicine, IV fluid, and tube feedings.   Durable power of  for healthcare (DPAHC):  This is a written record that states who you want to make healthcare choices for you when you are unable to make them for yourself. This person, called a proxy, is usually a family member or a friend. You may choose more than 1 proxy.  Do not resuscitate (DNR) order:  A DNR order is used in case your heart stops beating or you stop breathing. It is a request not to have certain forms of treatment, such as CPR. A DNR order may be included in other types of advance directives.  Medical directive:  This covers the care that you want if you are in a coma, near death, or unable to make decisions for yourself. You can list the treatments you want for each condition. Treatment may include pain medicine, surgery, blood transfusions, dialysis, IV or tube feedings, and a ventilator (breathing machine).  Values history:  This document has questions about your views, beliefs, and how you feel and think about life. This information can help others choose the care that you would choose.  Why are advance directives important?  An advance directive helps you control your care. Although spoken wishes may be used, it is better to have your wishes written down. Spoken wishes can be misunderstood, or not followed. Treatments may be given even if you do not want them. An advance directive may make it easier for your family to make difficult choices about your care.       © Copyright [employer] 2018 Information is for End User's use only and may not be sold, redistributed or otherwise used for commercial purposes. All illustrations and images included in CareNotes® are the copyrighted property of [email or web address] or [employer]

## 2024-08-05 ENCOUNTER — APPOINTMENT (OUTPATIENT)
Dept: LAB | Facility: CLINIC | Age: 74
End: 2024-08-05
Payer: COMMERCIAL

## 2024-08-05 ENCOUNTER — TELEPHONE (OUTPATIENT)
Age: 74
End: 2024-08-05

## 2024-08-05 DIAGNOSIS — E53.8 DEFICIENCY OF OTHER SPECIFIED B GROUP VITAMINS: ICD-10-CM

## 2024-08-05 DIAGNOSIS — Z12.5 SCREENING FOR PROSTATE CANCER: ICD-10-CM

## 2024-08-05 DIAGNOSIS — E11.65 UNCONTROLLED TYPE 2 DIABETES MELLITUS WITH HYPERGLYCEMIA (HCC): Primary | ICD-10-CM

## 2024-08-05 DIAGNOSIS — I63.9 STROKE (HCC): ICD-10-CM

## 2024-08-05 DIAGNOSIS — G20.C PARKINSONISM: ICD-10-CM

## 2024-08-05 DIAGNOSIS — R73.03 PREDIABETES: ICD-10-CM

## 2024-08-05 DIAGNOSIS — R25.1 TREMORS OF NERVOUS SYSTEM: ICD-10-CM

## 2024-08-05 LAB
ALBUMIN SERPL BCG-MCNC: 4.1 G/DL (ref 3.5–5)
ALP SERPL-CCNC: 87 U/L (ref 34–104)
ALT SERPL W P-5'-P-CCNC: 17 U/L (ref 7–52)
ANION GAP SERPL CALCULATED.3IONS-SCNC: 9 MMOL/L (ref 4–13)
AST SERPL W P-5'-P-CCNC: 14 U/L (ref 13–39)
BILIRUB SERPL-MCNC: 1.46 MG/DL (ref 0.2–1)
BUN SERPL-MCNC: 15 MG/DL (ref 5–25)
CALCIUM SERPL-MCNC: 9 MG/DL (ref 8.4–10.2)
CHLORIDE SERPL-SCNC: 101 MMOL/L (ref 96–108)
CHOLEST SERPL-MCNC: 120 MG/DL
CO2 SERPL-SCNC: 29 MMOL/L (ref 21–32)
CREAT SERPL-MCNC: 0.83 MG/DL (ref 0.6–1.3)
EST. AVERAGE GLUCOSE BLD GHB EST-MCNC: 303 MG/DL
GFR SERPL CREATININE-BSD FRML MDRD: 88 ML/MIN/1.73SQ M
GLUCOSE P FAST SERPL-MCNC: 268 MG/DL (ref 65–99)
HBA1C MFR BLD: 12.2 %
HDLC SERPL-MCNC: 47 MG/DL
LDLC SERPL CALC-MCNC: 49 MG/DL (ref 0–100)
POTASSIUM SERPL-SCNC: 3.8 MMOL/L (ref 3.5–5.3)
PROT SERPL-MCNC: 7.1 G/DL (ref 6.4–8.4)
PSA SERPL-MCNC: 0.56 NG/ML (ref 0–4)
SODIUM SERPL-SCNC: 139 MMOL/L (ref 135–147)
TRIGL SERPL-MCNC: 119 MG/DL
VIT B12 SERPL-MCNC: 365 PG/ML (ref 180–914)

## 2024-08-05 PROCEDURE — 36415 COLL VENOUS BLD VENIPUNCTURE: CPT

## 2024-08-05 PROCEDURE — 82607 VITAMIN B-12: CPT

## 2024-08-05 PROCEDURE — G0103 PSA SCREENING: HCPCS

## 2024-08-05 PROCEDURE — 83036 HEMOGLOBIN GLYCOSYLATED A1C: CPT

## 2024-08-05 PROCEDURE — 80061 LIPID PANEL: CPT

## 2024-08-05 PROCEDURE — 80053 COMPREHEN METABOLIC PANEL: CPT

## 2024-08-05 NOTE — TELEPHONE ENCOUNTER
Patient's son reports he has some concerns about patient's labs from today and would like his PCP to review and a call back.

## 2024-08-30 ENCOUNTER — CONSULT (OUTPATIENT)
Dept: ENDOCRINOLOGY | Facility: CLINIC | Age: 74
End: 2024-08-30
Payer: COMMERCIAL

## 2024-08-30 VITALS
DIASTOLIC BLOOD PRESSURE: 68 MMHG | TEMPERATURE: 98 F | SYSTOLIC BLOOD PRESSURE: 130 MMHG | WEIGHT: 161 LBS | HEIGHT: 71 IN | HEART RATE: 71 BPM | BODY MASS INDEX: 22.54 KG/M2 | OXYGEN SATURATION: 98 %

## 2024-08-30 DIAGNOSIS — E11.65 UNCONTROLLED TYPE 2 DIABETES MELLITUS WITH HYPERGLYCEMIA (HCC): Primary | ICD-10-CM

## 2024-08-30 DIAGNOSIS — I10 PRIMARY HYPERTENSION: ICD-10-CM

## 2024-08-30 DIAGNOSIS — I16.0 HYPERTENSIVE URGENCY: ICD-10-CM

## 2024-08-30 DIAGNOSIS — E78.2 MIXED HYPERLIPIDEMIA: ICD-10-CM

## 2024-08-30 PROCEDURE — 99204 OFFICE O/P NEW MOD 45 MIN: CPT | Performed by: STUDENT IN AN ORGANIZED HEALTH CARE EDUCATION/TRAINING PROGRAM

## 2024-08-30 RX ORDER — AMLODIPINE BESYLATE 10 MG/1
10 TABLET ORAL DAILY
Qty: 90 TABLET | Refills: 1 | Status: SHIPPED | OUTPATIENT
Start: 2024-08-30

## 2024-08-30 RX ORDER — REPAGLINIDE 0.5 MG/1
0.5 TABLET ORAL
Qty: 270 TABLET | Refills: 0 | Status: SHIPPED | OUTPATIENT
Start: 2024-08-30 | End: 2024-11-28

## 2024-08-30 NOTE — ASSESSMENT & PLAN NOTE
Blood pressure goal less than 130/80  Currently on lisinopril and amlodipine which will be maintained.

## 2024-08-30 NOTE — PROGRESS NOTES
New Patient Progress Note      Cc: diabetes    Referring Provider  Katerina Pimentel Md  [address]  [address]  [place] [address]     History of Present Illness:   Edy Strickland is a 73 y.o. male who is referred by PCP for newly diagnosed diabetes.    he repots weight loss, blurry vision, denies polyuria or polydipsia.     He just received glucometer, though not sure if he knows      Eggs , potato, hawkins, orange juice, one or 2 glass   Chicken, hamburger, fish,   Steak , fish, chicken, peanut butter,   [place] bread , one or 2 load of bread  Up to  12 italian cookies daily, ice cream, jello with whipped cream.        Opthamology: no  Podiatry: no      Has hypertension: followed by PCP; on lisinopril 40 mg daily  Has hyperlipidemia: followed by PCP; on Crestor 10 mg daily - tolerating well, no myalgias.    Thyroid disorders: no  History of pancreatitis: no    Patient Active Problem List   Diagnosis    Hypertension    Hypokalemia    Orthopnea    Fatty liver    Elevated glucose    Benign prostatic hyperplasia with lower urinary tract symptoms    Abdominal wall asymmetry    Erectile dysfunction following prostate ablative therapy    Mixed hyperlipidemia    Pulmonary nodule    Pure hypercholesterolemia    Nocturnal polyuria    Stage 2 chronic kidney disease    Tremors of nervous system    Prediabetes    Uncontrolled type 2 diabetes mellitus with hyperglycemia (HCC)      Past Medical History:   Diagnosis Date    Hypertension     Liver disease       Past Surgical History:   Procedure Laterality Date    PROSTATE SURGERY        History reviewed. No pertinent family history.  Social History     Tobacco Use    Smoking status: Former     Current packs/day: 0.00     Types: Cigarettes     Quit date:      Years since quittin.6    Smokeless tobacco: Never   Substance Use Topics    Alcohol use: Not Currently     No Known Allergies      Current Outpatient Medications:     amLODIPine (NORVASC) 10 mg tablet, TAKE  "1 TABLET BY MOUTH EVERY DAY, Disp: 90 tablet, Rfl: 1    Aspirin Low Dose 81 MG chewable tablet, CHEW 1 TABLET BY MOUTH DAILY, Disp: 90 tablet, Rfl: 1    lisinopril (ZESTRIL) 40 mg tablet, TAKE 1 TABLET BY MOUTH EVERY DAY, Disp: 90 tablet, Rfl: 1    metFORMIN (GLUCOPHAGE) 500 mg tablet, Take 2 tablets (1,000 mg total) by mouth 2 (two) times a day with meals, Disp: 360 tablet, Rfl: 0    repaglinide (PRANDIN) 0.5 mg tablet, Take 1 tablet (0.5 mg total) by mouth 3 (three) times a day before meals, Disp: 270 tablet, Rfl: 0    rosuvastatin (CRESTOR) 10 MG tablet, TAKE 1 TABLET BY MOUTH EVERY DAY, Disp: 90 tablet, Rfl: 1    sitaGLIPtin (JANUVIA) 50 mg tablet, Take 1 tablet (50 mg total) by mouth daily, Disp: 90 tablet, Rfl: 0    diazepam (VALIUM) 5 mg tablet, Take 1 tablet one hour before MRI, may take 1 dose just prior to exam if needed (Patient not taking: Reported on 7/8/2024), Disp: 2 tablet, Rfl: 0  Review of Systems   Constitutional:  Positive for unexpected weight change. Negative for appetite change and fatigue.   HENT:  Negative for trouble swallowing.    Eyes:  Positive for visual disturbance.   Cardiovascular:  Negative for chest pain and palpitations.   Gastrointestinal:  Negative for abdominal pain, constipation, diarrhea, nausea and vomiting.   Endocrine: Negative for polydipsia, polyphagia and polyuria.       Physical Exam:  Body mass index is 22.45 kg/m².  /68 (BP Location: Left arm, Patient Position: Sitting, Cuff Size: Adult)   Pulse 71   Temp 98 °F (36.7 °C)   Ht 5' 11\" (1.803 m)   Wt 73 kg (161 lb)   SpO2 98%   BMI 22.45 kg/m²    Wt Readings from Last 3 Encounters:   08/30/24 73 kg (161 lb)   07/08/24 71.7 kg (158 lb)   04/30/24 73.9 kg (163 lb)       GEN: NAD  E/n/m nl facies,   Eyes: no stare or proptosis,   Neck: trachea midline, thyroid NT to palpation, nl in size,  CV; heart reg rate s1s2 nl,   Resp: CTAB, good effort  Ab+BS  Neuro: no tremor,   Skin: warm and dry,  Ext: no edema " "bilaterally,   Psych: nl mood and affect, no gross lapses in memory      Labs:   No components found for: \"HA1C\"  No components found for: \"GLU\"    Lab Results   Component Value Date    CREATININE 0.83 08/05/2024    CREATININE 1.15 06/08/2023    CREATININE 0.87 04/09/2023    BUN 15 08/05/2024    K 3.8 08/05/2024     08/05/2024    CO2 29 08/05/2024     GFR, Calculated   Date Value Ref Range Status   11/12/2020 68 >60 mL/min/1.73m2 Final     Comment:     mL/min per 1.73 square meters                                            Normal Function or Mild Renal    Disease (if clinically at risk):  >or=60  Moderately Decreased:                30-59  Severely Decreased:                  15-29  Renal Failure:                         <15                                            -American GFR: multiply reported GFR by 1.16    Please note that the eGFR is based on the CKD-EPI calculation, and is not intended to be used for drug dosing.                                            Note: Calculated GFR may not be an accurate indicator of renal function if the patient's renal function is not in a steady state.     eGFR   Date Value Ref Range Status   08/05/2024 88 ml/min/1.73sq m Final     No components found for: \"MALBCRER\"    Lab Results   Component Value Date    HDL 47 08/05/2024    TRIG 119 08/05/2024       Lab Results   Component Value Date    ALT 17 08/05/2024    AST 14 08/05/2024    ALKPHOS 87 08/05/2024       Lab Results   Component Value Date    TSH 1.05 01/05/2019       Impression:  1. Uncontrolled type 2 diabetes mellitus with hyperglycemia (HCC)    2. Primary hypertension    3. Mixed hyperlipidemia           Plan:    Problem List Items Addressed This Visit          Cardiovascular and Mediastinum    Hypertension     Blood pressure goal less than 130/80  Currently on lisinopril and amlodipine which will be maintained.              Endocrine    Uncontrolled type 2 diabetes mellitus with hyperglycemia (HCC) - " Primary       Lab Results   Component Value Date    HGBA1C 12.2 (H) 08/05/2024     History of prediabetes and recently was found to have A1c of 12.2%, with symptomatic hyperglycemia including weight loss and blurry vision.  We reviewed pathophysiology of diabetes, type I versus type II, most likely in his case type 2 diabetes, importance of glycemic control to avoid complications.  I believe that he will benefit from insulin therapy, he is very reluctant and does not wish to start insulin.  I started metformin 500 mg twice a day which will be slowly increased to 1000 g twice a day if he tolerates.  Prandin 0.5 mg 3 times before each meal.  Januvia 50 mg daily.  I asked him to check his blood sugar 3 times a day and send me his sugar log in 1 month to review  Emphasized importance of daily exercise, weight loss, caloric reduction, small meals, consumption of multiple servings of fruits and vegetables and avoidance of concentrated sweets such as juice and soda.   He was referred to CDE for MNT.  Ophthalmology and podiatry follow-up.  Return back in 2 months.  Labs prior to next visit.             Relevant Medications    metFORMIN (GLUCOPHAGE) 500 mg tablet    repaglinide (PRANDIN) 0.5 mg tablet    sitaGLIPtin (JANUVIA) 50 mg tablet    Other Relevant Orders    Ambulatory referral to Diabetic Education    Hemoglobin A1C    Comprehensive metabolic panel    Albumin / creatinine urine ratio       Other    Mixed hyperlipidemia     Continue Crestor 10 mg daily.                  Discussed with the patient and all questioned fully answered. He will call me if any problems arise.    Counseled patient on diagnostic results, prognosis, risk and benefit of treatment options, instruction for management, importance of treatment compliance, Risk  factor reduction and impressions      Kerri Linares MD

## 2024-08-30 NOTE — PATIENT INSTRUCTIONS
Start taking metformin 500 mg with breakfast and with dinner, after 1 week take 2 with breakfast and 1 with dinner and after 2 weeks take 2 with breakfast and 2 with dinner.  We have started repaglinide (Prandin), 0.5 mg to be taken half an hour before each meal.  Januvia 50 mg daily

## 2024-08-30 NOTE — ASSESSMENT & PLAN NOTE
Lab Results   Component Value Date    HGBA1C 12.2 (H) 08/05/2024     History of prediabetes and recently was found to have A1c of 12.2%, with symptomatic hyperglycemia including weight loss and blurry vision.  We reviewed pathophysiology of diabetes, type I versus type II, most likely in his case type 2 diabetes, importance of glycemic control to avoid complications.  I believe that he will benefit from insulin therapy, he is very reluctant and does not wish to start insulin.  I started metformin 500 mg twice a day which will be slowly increased to 1000 g twice a day if he tolerates.  Prandin 0.5 mg 3 times before each meal.  Januvia 50 mg daily.  I asked him to check his blood sugar 3 times a day and send me his sugar log in 1 month to review  Emphasized importance of daily exercise, weight loss, caloric reduction, small meals, consumption of multiple servings of fruits and vegetables and avoidance of concentrated sweets such as juice and soda.   He was referred to CDE for MNT.  Ophthalmology and podiatry follow-up.  Return back in 2 months.  Labs prior to next visit.

## 2024-09-09 ENCOUNTER — TELEPHONE (OUTPATIENT)
Dept: NEUROLOGY | Facility: CLINIC | Age: 74
End: 2024-09-09

## 2024-09-09 NOTE — TELEPHONE ENCOUNTER
Spoke with patient son and informed him. That the patient appt time needs to be moved up to 3 pm instead of 4 pm. Patient son agreed. Appt reminder card sent as well.

## 2024-10-12 DIAGNOSIS — I63.9 STROKE (HCC): ICD-10-CM

## 2024-10-14 RX ORDER — LORATADINE 10 MG
81 TABLET ORAL DAILY
Qty: 90 TABLET | Refills: 1 | Status: SHIPPED | OUTPATIENT
Start: 2024-10-14

## 2024-10-25 DIAGNOSIS — E78.2 MIXED HYPERLIPIDEMIA: ICD-10-CM

## 2024-10-25 DIAGNOSIS — I10 PRIMARY HYPERTENSION: ICD-10-CM

## 2024-10-25 RX ORDER — ROSUVASTATIN CALCIUM 10 MG/1
10 TABLET, COATED ORAL DAILY
Qty: 90 TABLET | Refills: 1 | Status: SHIPPED | OUTPATIENT
Start: 2024-10-25

## 2024-10-25 RX ORDER — LISINOPRIL 40 MG/1
TABLET ORAL
Qty: 90 TABLET | Refills: 1 | Status: SHIPPED | OUTPATIENT
Start: 2024-10-25

## 2024-11-19 ENCOUNTER — OFFICE VISIT (OUTPATIENT)
Dept: NEUROLOGY | Facility: CLINIC | Age: 74
End: 2024-11-19
Payer: COMMERCIAL

## 2024-11-19 VITALS
SYSTOLIC BLOOD PRESSURE: 120 MMHG | HEIGHT: 71 IN | DIASTOLIC BLOOD PRESSURE: 70 MMHG | HEART RATE: 86 BPM | BODY MASS INDEX: 21.7 KG/M2 | WEIGHT: 155 LBS

## 2024-11-19 DIAGNOSIS — M79.89 SWELLING OF RIGHT HAND: ICD-10-CM

## 2024-11-19 DIAGNOSIS — I63.9 STROKE (HCC): ICD-10-CM

## 2024-11-19 DIAGNOSIS — R25.1 TREMORS OF NERVOUS SYSTEM: Primary | ICD-10-CM

## 2024-11-19 PROCEDURE — G2211 COMPLEX E/M VISIT ADD ON: HCPCS | Performed by: PSYCHIATRY & NEUROLOGY

## 2024-11-19 PROCEDURE — 99214 OFFICE O/P EST MOD 30 MIN: CPT | Performed by: PSYCHIATRY & NEUROLOGY

## 2024-11-19 NOTE — ASSESSMENT & PLAN NOTE
Patient is a 73-year-old male with a history of fatty liver, hypertension, hypokalemia, orthopnea, elevated glucose, hyperlipidemia, and smoking who presents to the clinic today in regards to follow-up for his tremors.  Neurological examination is consistent with his prior examination.    Today, we discussed extensively in regards to the NM brain DaTscan that was done on 4/29/2024 and the results of it.  These results were discussed at the last visit as well but at that time, patient was not sure if he wanted to initiate carbidopa-levodopa.  At today's visit, patient still is unsure about starting new medications because he does not like to take many medications and these tremors are not affecting his activities of daily living.  We discussed that it is okay not to start medications but would definitely recommend doing PT/OT to preserve function because patient states that he was having difficulties with fine motor movements.  Patient and his son were agreeable to the PT/OT referrals.    Along with that, patient was advised to give us a call if there are any changes in his symptoms.  Patient was agreeable to that.      Plan:  PT/OT referrals given  Monitor for worsening symptoms  Call for any questions or concerns  The patient indicates understanding of these issues and agrees with the plan.  This patient case was discussed with Dr. Antionette Hughes.  Return in about 6 months (around 5/19/2025).    Orders:    Ambulatory Referral to Physical Therapy; Future    Ambulatory Referral to Occupational Therapy; Future

## 2024-11-19 NOTE — ASSESSMENT & PLAN NOTE
Patient's MRI showed that there were tiny posterior gangliocapsular lacunar infarcts.  Given that, patient is an appropriate candidate for starting aspirin 81 mg for stroke prevention.  Patient on Crestor 10 mg with last LDL of 49.     Plan:  For stroke prevention continue with: Aspirin 81 mg  BP goal < 130/80. At goal in office.  LDL goal <70, Continue Crestor 10 mg  Recommend mediterranean diet & regular exercise regimen atleast 4-5 times a week for 20-30 minutes.   Monitor for worsening symptoms  Call for any questions or concerns  Patient advised to go to the ED with any new stroke-like symptoms.

## 2024-11-19 NOTE — PROGRESS NOTES
Name: Edy Strickland      : 1950      MRN: [id number]  Encounter Provider: Katlyn Puente DO  Encounter Date: 2024   Encounter department: [facility]    :  Assessment & Plan  Tremors of nervous system  Patient is a 73-year-old male with a history of fatty liver, hypertension, hypokalemia, orthopnea, elevated glucose, hyperlipidemia, and smoking who presents to the clinic today in regards to follow-up for his tremors.  Neurological examination is consistent with his prior examination.    Today, we discussed extensively in regards to the NM brain DaTscan that was done on 2024 and the results of it.  These results were discussed at the last visit as well but at that time, patient was not sure if he wanted to initiate carbidopa-levodopa.  At today's visit, patient still is unsure about starting new medications because he does not like to take many medications and these tremors are not affecting his activities of daily living.  We discussed that it is okay not to start medications but would definitely recommend doing PT/OT to preserve function because patient states that he was having difficulties with fine motor movements.  Patient and his son were agreeable to the PT/OT referrals.    Along with that, patient was advised to give us a call if there are any changes in his symptoms.  Patient was agreeable to that.      Plan:  PT/OT referrals given  Monitor for worsening symptoms  Call for any questions or concerns  The patient indicates understanding of these issues and agrees with the plan.  This patient case was discussed with Dr. Antionette Hughes.  Return in about 6 months (around 2025).    Orders:    Ambulatory Referral to Physical Therapy; Future    Ambulatory Referral to Occupational Therapy; Future    Stroke (HCC)  Patient's MRI showed that there were tiny posterior gangliocapsular lacunar infarcts.  Given that, patient is an appropriate candidate for starting aspirin  81 mg for stroke prevention.  Patient on Crestor 10 mg with last LDL of 49.     Plan:  For stroke prevention continue with: Aspirin 81 mg  BP goal < 130/80. At goal in office.  LDL goal <70, Continue Crestor 10 mg  Recommend mediterranean diet & regular exercise regimen atleast 4-5 times a week for 20-30 minutes.   Monitor for worsening symptoms  Call for any questions or concerns  Patient advised to go to the ED with any new stroke-like symptoms.       Swelling of right hand  At the last visit, patient had swelling of the right hand and a VAS duplex study was ordered but patient did not get it done.  At this visit, patient does not have the swelling anymore and as a result, it is okay not to get that study.         History of Present Illness   HPI    Patient is a 73-year-old male with a history of fatty liver, hypertension, hypokalemia, orthopnea, elevated glucose, hyperlipidemia, and smoking who presents to the clinic today in regards to follow-up for his tremors.  Patient was last seen in the office on 4/30/2024 by Dr. Loera and myself.    Patient's son was on the phone during the visit.    To recap, patient has had tremors since approximately January 2023 and he presented to the neurology clinic on 1/23/2024 as a new patient for evaluation.  Patient had some rigidity along with tremors on physical examination which is why a DaTscan was ordered for the patient along with an MRI to rule out any other etiologies.  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  At the last visit, patient was offered carbidopa-levodopa but he had deferred that since he was not very symptomatic.  Patient's MRI had shown lacunar infarcts which we discussed at the last visit as well and initiated the patient on aspirin 81 mg daily for stroke prevention.  Also, patient did have some swelling of his right hand and a duplex study was ordered for him which has not been completed at this time.    Since  last time the patient was seen in the office, patient states that he does not really notice much of a difference at this time.  Patient states that either the tremors have gotten better or he has just gotten used to the.  At this time, patient is still able to do all of his activities of daily living independently.    Of note, patient does state that he has this numbness in his right arm sometimes but it goes away when he massages it a little bit.    There are no other questions or concerns at this time.    Previous Work-Up:  - MRI Brain w wo contrast 3/23/24: No mass effect, acute intracranial hemorrhage or evidence of recent infarction. Mild to moderate chronic microvascular ischemic change. No abnormal parenchymal or leptomeningeal enhancement identified. Tiny posterior gangliocapsular lacunar infarcts with otherwise no significant signal abnormality involving the basal ganglia as clinically questioned.  - NM Brain JUDY scan w/Rx 4/29/24: Abnormal radiotracer distribution. 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       Review of Systems   HENT:  Negative for trouble swallowing.    Neurological:  Positive for weakness and numbness (Transient numbness which goes away). Negative for dizziness, tremors, seizures, syncope, facial asymmetry, speech difficulty, light-headedness and headaches.         Pertinent Medical History   Medical History Reviewed by provider this encounter:     .  Past Medical History   Past Medical History:   Diagnosis Date    Hypertension     Liver disease      Past Surgical History:   Procedure Laterality Date    PROSTATE SURGERY       History reviewed. No pertinent family history.   reports that he quit smoking about 4 years ago. His smoking use included cigarettes. He has never used smokeless tobacco. He reports that he does not currently use alcohol. He reports that he does not currently use drugs.    Current Outpatient Medications on File Prior to Visit   Medication Sig  Dispense Refill    amLODIPine (NORVASC) 10 mg tablet TAKE 1 TABLET BY MOUTH EVERY DAY 90 tablet 1    [facility] Aspirin Adult Low Dose 81 MG chewable tablet CHEW 1 TABLET BY MOUTH EVERY DAY 90 tablet 1    lisinopril (ZESTRIL) 40 mg tablet TAKE 1 TABLET BY MOUTH EVERY DAY 90 tablet 1    metFORMIN (GLUCOPHAGE) 500 mg tablet Take 2 tablets (1,000 mg total) by mouth 2 (two) times a day with meals 360 tablet 0    repaglinide (PRANDIN) 0.5 mg tablet Take 1 tablet (0.5 mg total) by mouth 3 (three) times a day before meals 270 tablet 0    rosuvastatin (CRESTOR) 10 MG tablet TAKE 1 TABLET BY MOUTH EVERY DAY 90 tablet 1    sitaGLIPtin (JANUVIA) 50 mg tablet Take 1 tablet (50 mg total) by mouth daily 90 tablet 0    [DISCONTINUED] diazepam (VALIUM) 5 mg tablet Take 1 tablet one hour before MRI, may take 1 dose just prior to exam if needed (Patient not taking: Reported on 7/8/2024) 2 tablet 0     No current facility-administered medications on file prior to visit.   No Known Allergies   Current Outpatient Medications on File Prior to Visit   Medication Sig Dispense Refill    amLODIPine (NORVASC) 10 mg tablet TAKE 1 TABLET BY MOUTH EVERY DAY 90 tablet 1    [facility] Aspirin Adult Low Dose 81 MG chewable tablet CHEW 1 TABLET BY MOUTH EVERY DAY 90 tablet 1    lisinopril (ZESTRIL) 40 mg tablet TAKE 1 TABLET BY MOUTH EVERY DAY 90 tablet 1    metFORMIN (GLUCOPHAGE) 500 mg tablet Take 2 tablets (1,000 mg total) by mouth 2 (two) times a day with meals 360 tablet 0    repaglinide (PRANDIN) 0.5 mg tablet Take 1 tablet (0.5 mg total) by mouth 3 (three) times a day before meals 270 tablet 0    rosuvastatin (CRESTOR) 10 MG tablet TAKE 1 TABLET BY MOUTH EVERY DAY 90 tablet 1    sitaGLIPtin (JANUVIA) 50 mg tablet Take 1 tablet (50 mg total) by mouth daily 90 tablet 0    [DISCONTINUED] diazepam (VALIUM) 5 mg tablet Take 1 tablet one hour before MRI, may take 1 dose just prior to exam if needed (Patient not taking: Reported on 7/8/2024) 2 tablet 0     No  "current facility-administered medications on file prior to visit.      Social History     Tobacco Use    Smoking status: Former     Current packs/day: 0.00     Types: Cigarettes     Quit date: 2020     Years since quittin.8    Smokeless tobacco: Never   Vaping Use    Vaping status: Never Used   Substance and Sexual Activity    Alcohol use: Not Currently    Drug use: Not Currently    Sexual activity: Not on file        Objective   /70 (BP Location: Left arm, Patient Position: Sitting, Cuff Size: Adult)   Pulse 86   Ht 5' 11\" (1.803 m)   Wt 70.3 kg (155 lb)   BMI 21.62 kg/m²     Physical Exam  Vitals reviewed.   HENT:      Head: Normocephalic and atraumatic.   Eyes:      Extraocular Movements: Extraocular movements intact and EOM normal.      Conjunctiva/sclera: Conjunctivae normal.      Pupils: Pupils are equal, round, and reactive to light.   Cardiovascular:      Rate and Rhythm: Normal rate.   Pulmonary:      Effort: Pulmonary effort is normal.   Musculoskeletal:         General: Normal range of motion.   Skin:     General: Skin is warm.   Neurological:      Mental Status: He is alert and oriented to person, place, and time.      Motor: Motor strength is normal.     Coordination: Finger-Nose-Finger Test normal.      Deep Tendon Reflexes:      Reflex Scores:       Tricep reflexes are 2+ on the right side and 2+ on the left side.       Bicep reflexes are 2+ on the right side and 2+ on the left side.       Brachioradialis reflexes are 2+ on the right side and 2+ on the left side.       Patellar reflexes are 2+ on the right side and 2+ on the left side.       Achilles reflexes are 2+ on the right side and 2+ on the left side.  Psychiatric:         Mood and Affect: Mood normal.         Speech: Speech normal.         Behavior: Behavior normal.         Neurologic Exam     Mental Status   Oriented to person, place, and time.   Attention: normal. Concentration: normal.   Speech: speech is normal   Level of " consciousness: alert    Cranial Nerves     CN II   Right visual field deficit: none  Left visual field deficit: none     CN III, IV, VI   Pupils are equal, round, and reactive to light.  Extraocular motions are normal.     CN V   Facial sensation intact.     CN VII   Facial expression full, symmetric.     CN VIII   CN VIII normal.     CN IX, X   CN IX normal.   CN X normal.     CN XI   CN XI normal.     CN XII   CN XII normal.     Motor Exam   Right arm tone: cogwheel rigidity and increased  Left arm tone: cogwheel rigidity and increased  Right arm pronator drift: absent  Left arm pronator drift: absent    Strength   Strength 5/5 throughout.     Sensory Exam   Light touch normal.     Gait, Coordination, and Reflexes     Coordination   Finger to nose coordination: normal    Tremor   Resting tremor: present (RUE)  Intention tremor: present (RUE)  Action tremor: left arm and right arm    Reflexes   Right brachioradialis: 2+  Left brachioradialis: 2+  Right biceps: 2+  Left biceps: 2+  Right triceps: 2+  Left triceps: 2+  Right patellar: 2+  Left patellar: 2+  Right achilles: 2+  Left achilles: 2+      Unified Parkinson Disease Rating Scale (UPDRS)                                   Speech  0: Normal: No speech problems.   Facial Expression  0: Normal: Normal facial expression.   Rigidity - Neck  0: Normal: No rigidity.   Rigidity - Upper Extremity (Right)  2: Mild: Rigidity detected without the activation maneuver, but full range of motion is easily achieved.   Rigidity - Upper Extremity (Left)   1: Slight: Rigidity only detected with activation maneuver.   Rigidity - Lower Extremity (Right)  0: Normal: No rigidity.   Rigidity - Lower Extremity (Left)   0: Normal: No rigidity.   Finger Taps (Right)   1: Slight: Any of the following: a) the regular rhythm is broken with one or two interruptions or hesitations of the tapping movement; b) slight slowing; c) the amplitude decrements near the end of the 10 taps.   Finger  Taps (Left)   1: Slight: Any of the following: a) the regular rhythm is broken with one or two interruptions or hesitations of the tapping movement; b) slight slowing; c) the amplitude decrements near the end of the 10 taps.   Hand Movement (Right)  1: Slight: Any of the following: a) the regular rhythm is broken with one or two interruptions or hesitations of the tapping movement; b) slight slowing; c) the amplitude decrements near the end of the 10 taps.   Hand Movement (Left)   1: Slight: Any of the following: a) the regular rhythm is broken with one or two interruptions or hesitations of the tapping movement; b) slight slowing; c) the amplitude decrements near the end of the 10 taps.   Toe Tapping (Right) 1: Slight: Any of the following: a) the regular rhythm is broken with one or two interruptions or hesitations of the tapping movement; b) slight slowing; c) the amplitude decrements near the end of the 10 taps.   Toe Tapping (Left) 1: Slight: Any of the following: a) the regular rhythm is broken with one or two interruptions or hesitations of the tapping movement; b) slight slowing; c) the amplitude decrements near the end of the 10 taps.   Leg Agility (Right)  0: Normal: No problems.   Leg Agility (Left)   0: Normal: No problems.   Arising from Chair   0: Normal: No problems. Able to arise quickly without hesitation.   Gait   1: Slight: Independent walking with minor gait impairment.   Freezing of Gait 0: Normal: No freezing.   Postural Stability   0: Normal: No problems: Recovers with one or two steps.   Posture 0: Normal: No problems.   Global spontaneity of movement 0: Normal: No problems.   Postural Tremor (Right) 1: Slight: Tremor is present but less than 1 cm in amplitude.   Postural Tremor (Left) 1: Slight: Tremor is present but less than 1 cm in amplitude.   Kinetic Tremor (Right)  1: Slight: Tremor is present but less than 1 cm in amplitude.   Kinetic Tremor (Left)  1: Slight: Tremor is present but  less than 1 cm in amplitude.   Rest tremor amplitude RUE 1: Slight: Tremor is present but less than 1 cm in amplitude.   Rest tremor amplitude LUE 0: Normal: No tremor.   Rest tremor amplitude RLE 0: Normal: No tremor.   Reset tremor amplitude LLE 0: Normal: No tremor.   Lip/Jaw Tremor  0: Normal: No tremor.   Consistency of tremor 2: Mild: Tremor at rest is present 26-50% of the entire examination period.       Results/Data:  I have reviewed the results and images from PACS in detail with the patient if applicable.    SL AMB Radiology Results Review : No pertinent imaging studies reviewed.    Administrative Statements   I have spent a total time of 40 minutes in caring for this patient on the day of the visit/encounter including Diagnostic results, Prognosis, Risks and benefits of tx options, Instructions for management, Impressions, Counseling / Coordination of care, Documenting in the medical record, Reviewing / ordering tests, medicine, procedures  , and Obtaining or reviewing history  .

## 2024-12-09 ENCOUNTER — TELEPHONE (OUTPATIENT)
Age: 74
End: 2024-12-09

## 2024-12-09 ENCOUNTER — OFFICE VISIT (OUTPATIENT)
Dept: ENDOCRINOLOGY | Facility: CLINIC | Age: 74
End: 2024-12-09
Payer: COMMERCIAL

## 2024-12-09 VITALS
WEIGHT: 157 LBS | HEART RATE: 107 BPM | DIASTOLIC BLOOD PRESSURE: 82 MMHG | BODY MASS INDEX: 21.98 KG/M2 | OXYGEN SATURATION: 98 % | SYSTOLIC BLOOD PRESSURE: 152 MMHG | HEIGHT: 71 IN | TEMPERATURE: 98.5 F

## 2024-12-09 DIAGNOSIS — E11.65 UNCONTROLLED TYPE 2 DIABETES MELLITUS WITH HYPERGLYCEMIA (HCC): Primary | ICD-10-CM

## 2024-12-09 DIAGNOSIS — E78.2 MIXED HYPERLIPIDEMIA: ICD-10-CM

## 2024-12-09 DIAGNOSIS — I10 PRIMARY HYPERTENSION: ICD-10-CM

## 2024-12-09 LAB — SL AMB POCT HEMOGLOBIN AIC: 5.3 (ref ?–6.5)

## 2024-12-09 PROCEDURE — 99214 OFFICE O/P EST MOD 30 MIN: CPT | Performed by: STUDENT IN AN ORGANIZED HEALTH CARE EDUCATION/TRAINING PROGRAM

## 2024-12-09 PROCEDURE — 83036 HEMOGLOBIN GLYCOSYLATED A1C: CPT | Performed by: STUDENT IN AN ORGANIZED HEALTH CARE EDUCATION/TRAINING PROGRAM

## 2024-12-09 NOTE — PATIENT INSTRUCTIONS
We decreased metformin to 500 mg twice a day   We discontinued repaglinide.  We continued Januvia 50 mg daily.  Return back in 4 months  Labs prior to next visit

## 2024-12-09 NOTE — ASSESSMENT & PLAN NOTE
Continue Crestor 10 mg daily.  Check lipid profile before next visit.    Orders:    Lipid Panel with Direct LDL reflex; Future

## 2024-12-09 NOTE — ASSESSMENT & PLAN NOTE
Lab Results   Component Value Date    HGBA1C 5.3 12/09/2024     Glycemic control has improved significantly with A1c of 5.3% he was congratulated on his hard work to manage diabetes and he was encouraged to continue to do so.  I made following changes.  Metformin was decreased to 500 mg twice a day, if diarrhea remains, to decrease to 500 mg daily.  Repaglinide discontinued.  Continue Januvia 50 mg daily.  He was instructed to check his blood sugar once a day.  Different times and bring his sugar log to next visit.  Return back in 4 months.  Labs prior to next visit.  Ophthalmology and podiatry follow-up.  Orders:    POCT hemoglobin A1c    metFORMIN (GLUCOPHAGE) 500 mg tablet; Take 1 tablet (500 mg total) by mouth 2 (two) times a day with meals    sitaGLIPtin (JANUVIA) 50 mg tablet; Take 1 tablet (50 mg total) by mouth daily    Ambulatory Referral to Ophthalmology; Future    Hemoglobin A1C; Future    Comprehensive metabolic panel; Future    Lipid Panel with Direct LDL reflex; Future    Albumin / creatinine urine ratio; Future

## 2024-12-09 NOTE — ASSESSMENT & PLAN NOTE
Blood pressure is above goal, 152/82, the goal is less than 130/80.  He was advised to check his blood pressure at home, if consistently above 130/80, will upgrade his antihypertensive medications.    Orders:    Comprehensive metabolic panel; Future

## 2024-12-09 NOTE — TELEPHONE ENCOUNTER
Patient calling stating he was just in for appointment today. States that Januvia was ordered for him.     Patient states he thought he was going to be discontinuing the Januvia.     Per office note it states that patient should continue to take Januvia and discontinue the Prandin.     Patient states he thought both were being discontinued.     Patient asking for call back from clinical staff to discuss.     Please advise.

## 2024-12-11 NOTE — TELEPHONE ENCOUNTER
Spoke with patients son, informed him of this. Said he will relay information to patient and call office back and let us know if he is going to discontinue medication for sure.

## 2025-01-09 ENCOUNTER — OFFICE VISIT (OUTPATIENT)
Dept: FAMILY MEDICINE CLINIC | Facility: CLINIC | Age: 75
End: 2025-01-09
Payer: COMMERCIAL

## 2025-01-09 VITALS
DIASTOLIC BLOOD PRESSURE: 82 MMHG | WEIGHT: 157 LBS | HEIGHT: 71 IN | OXYGEN SATURATION: 99 % | HEART RATE: 68 BPM | TEMPERATURE: 96.4 F | SYSTOLIC BLOOD PRESSURE: 124 MMHG | BODY MASS INDEX: 21.98 KG/M2

## 2025-01-09 DIAGNOSIS — E78.2 MIXED HYPERLIPIDEMIA: ICD-10-CM

## 2025-01-09 DIAGNOSIS — Z53.20 COLON CANCER SCREENING DECLINED: ICD-10-CM

## 2025-01-09 DIAGNOSIS — G20.C PARKINSONISM, UNSPECIFIED PARKINSONISM TYPE (HCC): ICD-10-CM

## 2025-01-09 DIAGNOSIS — E11.65 UNCONTROLLED TYPE 2 DIABETES MELLITUS WITH HYPERGLYCEMIA (HCC): Primary | ICD-10-CM

## 2025-01-09 DIAGNOSIS — I10 PRIMARY HYPERTENSION: ICD-10-CM

## 2025-01-09 PROCEDURE — 99214 OFFICE O/P EST MOD 30 MIN: CPT | Performed by: FAMILY MEDICINE

## 2025-01-09 NOTE — PROGRESS NOTES
Name: Edy Strickland      : 1950      MRN: [id number]  Encounter Provider: Katerina Pimentel MD  Encounter Date: 2025   Encounter department: [facility]  :  Assessment & Plan  Uncontrolled type 2 diabetes mellitus with hyperglycemia (HCC)  Well-controlled now 5.3.  Continue follow-up with endocrinology and continue diabetic regimen.  Labs already ordered through endocrinology.  States that he has an eye exam scheduled  Lab Results   Component Value Date    HGBA1C 5.3 2024            Primary hypertension  Blood pressure is well-controlled today 124/82, continue BP regimen.  Continue to check pressures at home       Mixed hyperlipidemia  Lipid panel previously ordered       Parkinsonism, unspecified Parkinsonism type (HCC)  Continue follow-up with neurology as scheduled       Colon cancer screening declined                History of Present Illness     Presents today for follow-up on hypertension diabetes.  He has been following up with endocrinology.  States he has been compliant with his medications.  He is also seeing neurology due to parkinsonism.    Hyperlipidemia  Pertinent negatives include no chest pain, myalgias or shortness of breath.   Hypertension  Pertinent negatives include no chest pain, headaches, neck pain, palpitations or shortness of breath.   Diabetes  Hypoglycemia symptoms include tremors. Pertinent negatives for hypoglycemia include no confusion, dizziness, headaches or nervousness/anxiousness. Pertinent negatives for diabetes include no chest pain, no fatigue and no weakness.     Review of Systems   Constitutional:  Negative for activity change, appetite change, chills, fatigue and fever.   HENT:  Negative for congestion, rhinorrhea, sneezing and sore throat.    Eyes:  Negative for pain, discharge, redness and itching.   Respiratory:  Negative for cough, chest tightness, shortness of breath and wheezing.    Cardiovascular:  Negative for chest pain and  "palpitations.   Gastrointestinal:  Negative for abdominal pain, constipation, diarrhea, nausea and vomiting.   Musculoskeletal:  Negative for arthralgias, gait problem, myalgias and neck pain.   Skin:  Negative for rash.   Neurological:  Positive for tremors. Negative for dizziness, weakness, numbness and headaches.   Hematological:  Negative for adenopathy.   Psychiatric/Behavioral:  Negative for confusion and suicidal ideas. The patient is not nervous/anxious.    All other systems reviewed and are negative.      Objective   /82   Pulse 68   Temp (!) 96.4 °F (35.8 °C)   Ht 5' 11\" (1.803 m)   Wt 71.2 kg (157 lb)   SpO2 99%   BMI 21.90 kg/m²      Physical Exam  Vitals reviewed.   Constitutional:       General: He is not in acute distress.     Appearance: Normal appearance. He is well-developed.   HENT:      Head: Normocephalic and atraumatic.      Right Ear: External ear normal.      Left Ear: External ear normal.      Nose: Nose normal.      Mouth/Throat:      Mouth: Mucous membranes are moist.   Eyes:      General: No scleral icterus.        Right eye: No discharge.         Left eye: No discharge.      Conjunctiva/sclera: Conjunctivae normal.   Cardiovascular:      Rate and Rhythm: Normal rate and regular rhythm.      Pulses: Normal pulses. no weak pulses.           Dorsalis pedis pulses are 2+ on the right side and 2+ on the left side.        Posterior tibial pulses are 2+ on the right side and 2+ on the left side.      Heart sounds: Normal heart sounds. No murmur heard.  Pulmonary:      Effort: Pulmonary effort is normal. No respiratory distress.      Breath sounds: Normal breath sounds. No wheezing.   Abdominal:      General: There is no distension.      Palpations: Abdomen is soft. There is no mass.      Tenderness: There is no abdominal tenderness.      Hernia: No hernia is present.   Musculoskeletal:         General: No swelling, tenderness, deformity or signs of injury. Normal range of motion.    "   Cervical back: Normal range of motion.   Feet:      Right foot:      Skin integrity: No ulcer, skin breakdown, erythema, warmth, callus or dry skin.      Left foot:      Skin integrity: No ulcer, skin breakdown, erythema, warmth, callus or dry skin.   Skin:     General: Skin is warm and dry.      Capillary Refill: Capillary refill takes less than 2 seconds.      Findings: No lesion or rash.   Neurological:      Mental Status: He is alert.      Motor: No weakness.      Gait: Gait normal.      Comments: tremors   Psychiatric:         Mood and Affect: Mood normal.         Behavior: Behavior normal.         Diabetic Foot Exam    Patient's shoes and socks removed.    Right Foot/Ankle   Right Foot Inspection  Skin Exam: skin normal and skin intact. No dry skin, no warmth, no callus, no erythema, no maceration, no abnormal color, no pre-ulcer, no ulcer and no callus.     Toe Exam: ROM and strength within normal limits.     Sensory   Monofilament testing: intact    Vascular  The right DP pulse is 2+. The right PT pulse is 2+.     Left Foot/Ankle  Left Foot Inspection  Skin Exam: skin normal and skin intact. No dry skin, no warmth, no erythema, no maceration, normal color, no pre-ulcer, no ulcer and no callus.     Toe Exam: ROM and strength within normal limits.     Sensory   Monofilament testing: intact    Vascular  The left DP pulse is 2+. The left PT pulse is 2+.     Assign Risk Category  No deformity present  No loss of protective sensation  No weak pulses  Risk: 0

## 2025-01-09 NOTE — ASSESSMENT & PLAN NOTE
Blood pressure is well-controlled today 124/82, continue BP regimen.  Continue to check pressures at home

## 2025-01-09 NOTE — ASSESSMENT & PLAN NOTE
Well-controlled now 5.3.  Continue follow-up with endocrinology and continue diabetic regimen.  Labs already ordered through endocrinology.  States that he has an eye exam scheduled  Lab Results   Component Value Date    HGBA1C 5.3 12/09/2024

## 2025-01-27 DIAGNOSIS — I10 PRIMARY HYPERTENSION: ICD-10-CM

## 2025-01-27 DIAGNOSIS — E78.2 MIXED HYPERLIPIDEMIA: ICD-10-CM

## 2025-01-27 RX ORDER — LISINOPRIL 40 MG/1
40 TABLET ORAL DAILY
Qty: 90 TABLET | Refills: 1 | Status: SHIPPED | OUTPATIENT
Start: 2025-01-27

## 2025-01-27 RX ORDER — ROSUVASTATIN CALCIUM 10 MG/1
10 TABLET, COATED ORAL DAILY
Qty: 90 TABLET | Refills: 1 | Status: SHIPPED | OUTPATIENT
Start: 2025-01-27

## 2025-03-31 ENCOUNTER — TELEPHONE (OUTPATIENT)
Age: 75
End: 2025-03-31

## 2025-03-31 NOTE — TELEPHONE ENCOUNTER
Received call from patient's son who said that he has been trying to reach the neurology office but the phone keeps disconnecting. Advised that I can send a message to their office for him so they can call him back and will also send this message to Dr Pimentel so he is updated as well.     Son would like to know if patient can still get the US done of his right hand because he was having problems with edema in that hand and now has numbness in that hand. Told him the order is still active, gave him # to call and schedule.   Patient had previously declined starting on sinemet for tremors but they have gotten worse and patient would like to start the medication before his next appt with neurology so they can discuss how it is working at next appt.     **Neurology, please f/u with patient.

## 2025-04-01 ENCOUNTER — TELEPHONE (OUTPATIENT)
Age: 75
End: 2025-04-01

## 2025-04-01 NOTE — TELEPHONE ENCOUNTER
Pt's son, Mikie called. Pt's most recent Medical Communication Consent form is more than a year old. Made Mikie aware of this. Okay to speak with Mikie to gather information, but pt's father will need to be called back with recommendations. # [phone]. New Medical Communication Consent form sent to pt through [employer], so this may be completed.     iMkie called to make Dr. Puente aware of two things. The first one is that pt's R upper extremity venous duplex is scheduled for 4/4/25. Pt has now been getting some numbness in this arm. Mikie did not have a lot of information about the numbness. He stated that he believes that pt gets numbness in the fingers of his right hand and this happens mostly at rest. Pt will massage his arm and hand and the numbness resolves temporarily. This happens mostly when pt is at rest or sedentary.    Mikie also stated that pt is now interested in starting carbidopa-levodopa for the arm tremors. Pt would like to start this prior to his scheduled OV on 5/20/25, if possible. The tremors are becoming more frequent and occur mostly when pt is stressed or excited about something. They are more noticeable, and he would like to start the medication if possible. Routed to provider to advise.

## 2025-04-04 ENCOUNTER — HOSPITAL ENCOUNTER (OUTPATIENT)
Dept: NON INVASIVE DIAGNOSTICS | Facility: HOSPITAL | Age: 75
Discharge: HOME | End: 2025-04-04
Payer: COMMERCIAL

## 2025-04-04 DIAGNOSIS — M79.89 SWELLING OF HAND: ICD-10-CM

## 2025-04-04 DIAGNOSIS — R60.9 EDEMA, UNSPECIFIED: ICD-10-CM

## 2025-04-04 PROCEDURE — 93971 EXTREMITY STUDY: CPT

## 2025-04-04 PROCEDURE — 93971 EXTREMITY STUDY: CPT | Performed by: SURGERY

## 2025-04-04 NOTE — TELEPHONE ENCOUNTER
pt's son meghna called regarding sinemet.    Made him aware that dr guaman sent a [employer] message yesterday. He did not see this message.  I reviewed dr guaman's [employer] message.  He will contact his dad and will either call back or reply via [employer] if wanting to start sinemet    He also wanted to make us aware that pt completed new communication consent today at another appt.  Confirmed that we are able to see this consent form and that it covers all offices.

## 2025-04-04 NOTE — TELEPHONE ENCOUNTER
All Conversations: US of hand and Sinemet  (Oldest Message First)Katlyn Puente, DO to Edy Strickland   KP      4/3/25 12:22 PM  Manuel Snow,     As the nursing staff informed you, the US order is still active so please go head and get that completed.      In regards to starting the Sinemet, I can definitely prescribe the Sinemet for you. The side effects include but are not limited to hallucinations, stomach upset, increased jerky movement, etc. Please let me know what other questions you have prior to me prescribing the medication.     Sincerely,  Dr. Puente     Last read by Edy Strickland at 12:23PM on 4/3/2025.

## 2025-04-04 NOTE — TELEPHONE ENCOUNTER
Katlyn Puente, DO to Christie Velasquez RN       4/3/25 12:23 PM  [facility],    Please contact the patient or his son and inform that I messaged them on MyChart. If they can respond to that, then I will do head and prescribe the medication. Thank you.    Sincerely,  Katlyn

## 2025-04-07 ENCOUNTER — RESULTS FOLLOW-UP (OUTPATIENT)
Dept: NEUROLOGY | Facility: CLINIC | Age: 75
End: 2025-04-07

## 2025-04-09 DIAGNOSIS — I16.0 HYPERTENSIVE URGENCY: ICD-10-CM

## 2025-04-09 DIAGNOSIS — I63.9 STROKE (HCC): ICD-10-CM

## 2025-04-09 RX ORDER — ASPIRIN 81 MG
81 TABLET,CHEWABLE ORAL DAILY
Qty: 90 TABLET | Refills: 0 | Status: SHIPPED | OUTPATIENT
Start: 2025-04-09

## 2025-04-09 NOTE — TELEPHONE ENCOUNTER
Requested medication(s) are due for refill today: Yes  **If antibiotic or given during sick visit, contact patient to discuss current symptoms.   **Confirm prescribing provider    LOV:  January 9, 2025  **If longer then 1 year, contact patient to schedule annual PRIOR to refilling. Once scheduled, adjust refill for 30 days, no refills.  **Update CareEverywhere to confirm not being seen elsewhere    NOV:  8/1/2025    Is patient due for annual visit: No  **If future appointment, adjust to annual/follow up.  ** No appointment call to schedule annual/follow up.    Route to PCP, unless PCP no longer here, then physician they are seeing next.

## 2025-04-10 RX ORDER — AMLODIPINE BESYLATE 10 MG/1
10 TABLET ORAL DAILY
Qty: 90 TABLET | Refills: 1 | Status: SHIPPED | OUTPATIENT
Start: 2025-04-10

## 2025-04-30 ENCOUNTER — TELEMEDICINE (OUTPATIENT)
Dept: ENDOCRINOLOGY | Facility: CLINIC | Age: 75
End: 2025-04-30
Payer: COMMERCIAL

## 2025-04-30 ENCOUNTER — TELEPHONE (OUTPATIENT)
Dept: ENDOCRINOLOGY | Facility: CLINIC | Age: 75
End: 2025-04-30

## 2025-04-30 DIAGNOSIS — E11.9 TYPE 2 DIABETES MELLITUS WITHOUT COMPLICATION, WITHOUT LONG-TERM CURRENT USE OF INSULIN (HCC): Primary | ICD-10-CM

## 2025-04-30 DIAGNOSIS — E78.2 MIXED HYPERLIPIDEMIA: ICD-10-CM

## 2025-04-30 DIAGNOSIS — I10 PRIMARY HYPERTENSION: ICD-10-CM

## 2025-04-30 PROCEDURE — G2211 COMPLEX E/M VISIT ADD ON: HCPCS | Performed by: STUDENT IN AN ORGANIZED HEALTH CARE EDUCATION/TRAINING PROGRAM

## 2025-04-30 PROCEDURE — 99214 OFFICE O/P EST MOD 30 MIN: CPT | Performed by: STUDENT IN AN ORGANIZED HEALTH CARE EDUCATION/TRAINING PROGRAM

## 2025-04-30 NOTE — PROGRESS NOTES
Virtual Regular VisitName: Edy Strickland      : 1950      MRN: [id number]  Encounter Provider: Kerri Linares MD  Encounter Date: 2025   Encounter department: [facility] [place]      Chief Complaint   Patient presents with   • Virtual Regular Visit   :  Assessment & Plan  Type 2 diabetes mellitus without complication, without long-term current use of insulin (HCC)    Lab Results   Component Value Date    HGBA1C 5.9 2025     Diabetes is well controlled with A1C of 5.9% and the goal of <7%,  Currently on Metformin 1000 mg bid, with no side effects reported, and to be continued.   He stopped taking Januvia, which will be maintained off the medication for now  Ophthalmology and podiatry follow up,  Follow up in 5 month,       Orders:  •  metFORMIN (GLUCOPHAGE) 500 mg tablet; Take 1 tablet (500 mg total) by mouth 2 (two) times a day with meals 2 tablets twice a day    Primary hypertension  Continue Lisinopril and amlodipine         Mixed hyperlipidemia  Continue Crestor  Check lipid profile                   History of Present Illness   History of Present Illness    Edy Strickland is a 74 y.o. male with type 2 diabetes seen in follow up. Denies recent severe hypoglycemic or severe hyperglycemic episodes. Denies any issues with his current regimen. Last A1C was 5.9%. Denies recent illness, hospitalization or steroid use.     He increased metformin to 1000 mg bid, as he noticed blood sugars were higher on 500 mg bid  Discontinued Januvia as he believes was doing nothing.    Check SMBG every other day, and blood sugars are ranging from 100 - 140 mg/dl.        Current regimen:   Metformin 1000 mg bid        Last Eye Exam: Not on file  Last Foot Exam: 2025  Health Maintenance   Topic Date Due   • Diabetic Eye Exam  2026 (Originally 1960)   • Diabetic Foot Exam  2026     Pertinent Medical History           Review of Systems as per HPI    Medical History  Reviewed by provider this encounter:     .  Current Outpatient Medications on File Prior to Visit   Medication Sig Dispense Refill   • amLODIPine (NORVASC) 10 mg tablet Take 1 tablet (10 mg total) by mouth daily 90 tablet 1   • aspirin (Aspirin Low Dose) 81 mg chewable tablet CHEW 1 TABLET BY MOUTH EVERY DAY 90 tablet 0   • lisinopril (ZESTRIL) 40 mg tablet TAKE 1 TABLET BY MOUTH EVERY DAY 90 tablet 1   • rosuvastatin (CRESTOR) 10 MG tablet TAKE 1 TABLET BY MOUTH EVERY DAY 90 tablet 1   • [DISCONTINUED] metFORMIN (GLUCOPHAGE) 500 mg tablet Take 1 tablet (500 mg total) by mouth 2 (two) times a day with meals (Patient taking differently: Take 500 mg by mouth 2 (two) times a day with meals 2 tablets twice a day) 180 tablet 1   • [DISCONTINUED] sitaGLIPtin (JANUVIA) 50 mg tablet Take 1 tablet (50 mg total) by mouth daily (Patient not taking: Reported on 4/30/2025) 90 tablet 1     No current facility-administered medications on file prior to visit.         Medical History Reviewed by provider this encounter:     .    Objective   There were no vitals taken for this visit.     There is no height or weight on file to calculate BMI.  Wt Readings from Last 3 Encounters:   04/30/25 72.5 kg (159 lb 12.8 oz)   01/09/25 71.2 kg (157 lb)   12/09/24 71.2 kg (157 lb)     Physical Exam    Physical Exam  Constitutional:       General: He is not in acute distress.     Appearance: He is not ill-appearing.   HENT:      Head: Normocephalic and atraumatic.   Pulmonary:      Effort: Pulmonary effort is normal. No respiratory distress.   Neurological:      Mental Status: He is oriented to person, place, and time.       Results    Labs:   Lab Results   Component Value Date    HGBA1C 5.9 04/30/2025    HGBA1C 5.3 12/09/2024    HGBA1C 12.2 (H) 08/05/2024     Lab Results   Component Value Date    CREATININE 0.83 08/05/2024    CREATININE 1.15 06/08/2023    CREATININE 0.87 04/09/2023    BUN 15 08/05/2024    K 3.8 08/05/2024     08/05/2024     "CO2 29 08/05/2024     GFR, Calculated   Date Value Ref Range Status   11/12/2020 68 >60 mL/min/1.73m2 Final     Comment:     mL/min per 1.73 square meters                                            Normal Function or Mild Renal    Disease (if clinically at risk):  >or=60  Moderately Decreased:                30-59  Severely Decreased:                  15-29  Renal Failure:                         <15                                            -American GFR: multiply reported GFR by 1.16    Please note that the eGFR is based on the CKD-EPI calculation, and is not intended to be used for drug dosing.                                            Note: Calculated GFR may not be an accurate indicator of renal function if the patient's renal function is not in a steady state.     eGFR   Date Value Ref Range Status   08/05/2024 88 ml/min/1.73sq m Final     Lab Results   Component Value Date    HDL 47 08/05/2024    TRIG 119 08/05/2024     Lab Results   Component Value Date    ALT 17 08/05/2024    AST 14 08/05/2024    ALKPHOS 87 08/05/2024     No results found for: \"[id number]\"    There are no Patient Instructions on file for this visit.    Discussed with the patient and all questioned fully answered. He will call me if any problems arise.        Administrative Statements   Encounter provider Kerri Linares MD    The Patient is located at Other and in the following state in which I hold an active license [place].    The patient was identified by name and date of birth. Edy Strickland was informed that this is a telemedicine visit and that the visit is being conducted through the Epic Embedded platform. He agrees to proceed..  My office door was closed. No one else was in the room.  He acknowledged consent and understanding of privacy and security of the video platform. The patient has agreed to participate and understands they can discontinue the visit at any time.      "

## 2025-04-30 NOTE — ASSESSMENT & PLAN NOTE
Lab Results   Component Value Date    HGBA1C 5.9 04/30/2025     Diabetes is well controlled with A1C of 5.9% and the goal of <7%,  Currently on Metformin 1000 mg bid, with no side effects reported, and to be continued.   He stopped taking Januvia, which will be maintained off the medication for now  Ophthalmology and podiatry follow up,  Follow up in 5 month,       Orders:  •  metFORMIN (GLUCOPHAGE) 500 mg tablet; Take 1 tablet (500 mg total) by mouth 2 (two) times a day with meals 2 tablets twice a day

## 2025-05-19 NOTE — PROGRESS NOTES
Name: Edy Strickland      : 1950      MRN: [id number]  Encounter Provider: Katlyn Puente DO  Encounter Date: 2025   Encounter department: [facility]  :  Assessment & Plan  Tremors of nervous system  Patient is a 74-year-old male with a history of fatty liver, hypertension, hypokalemia, orthopnea, elevated glucose, hyperlipidemia, tremors, and smoking who presents to the clinic today in regards to follow-up for his tremors.  Patient's neurological examination is consistent with his prior examination.    We had an extensive conversation with the patient in regards to initiating Sinemet.  Patient was hesitant initially but given that his symptoms seem to be progressing, patient was agreeable to trying a smaller dose. Patient stated that he would like to hold off on the PT/OT referrals at this time because he would like to see if the Sinemet in itself is helping.  Patient currently has no falls or balance problems however, it was recommended that at one point he should initiate PT/OT.  Patient was agreeable to our plan.    Plan:  Initiate Sinemet  mg 1 tablet TID  Patient is to update us in 6 weeks in regards to symptoms  PT/OT referrals were placed at the last visit  Monitor for worsening symptoms  Call for any questions or concerns  The patient indicates understanding of these issues and agrees with the plan.  This patient case was discussed with Dr. Spear.  Return in about 3 months (around 2025).    Orders:    carbidopa-levodopa (Sinemet)  mg per tablet; Take 1 tablet by mouth in the morning and 1 tablet at noon and 1 tablet in the evening.    Stroke (HCC)  Patient's MRI showed that there were tiny posterior gangliocapsular lacunar infarcts.  Given that, patient is an appropriate candidate for starting aspirin 81 mg for stroke prevention.  Patient on Crestor 10 mg with last LDL of 49.  Patient reports no stroke-like symptoms since the last time he was seen  in the office.     Plan:  For stroke prevention continue with: Aspirin 81 mg  BP goal < 130/80. At goal in office.  LDL goal <70, Continue Crestor 10 mg  Recommend mediterranean diet & regular exercise regimen atleast 4-5 times a week for 20-30 minutes.   Monitor for worsening symptoms  Call for any questions or concerns  Patient advised to go to the ED with any new stroke-like symptoms.       Intermittent Numbness  Patient has been having intermittent numbness which goes away when he massages his arm.  Patient recommended to continue to monitor the symptoms after initiating Sinemet.  If there is no improvement then, we will order appropriate work-up at the next visit.  Patient agreeable to our plan.       This note was completed in part utilizing Dragon Dictation Software. Grammatical errors, random word insertions, spelling mistakes, and incomplete sentences may be an occasional consequence of this system secondary to software limitations, ambient noise, and hardware issues.  If you have any questions or concerns about the content, text, or information contained within the body of this dictation, please contact the provider for clarification.      History of Present Illness   HPI     Patient is a 74-year-old male with a history of fatty liver, hypertension, hypokalemia, orthopnea, elevated glucose, hyperlipidemia, tremors, and smoking who presents to the clinic today in regards to follow-up for his tremors.  Patient was last seen in the office on 11/19/2024 by Dr. Hughes and myself.  Patient accompanied by his son today.    To recap, patient has had tremors since approximately January 2023 and he presented to the neurology clinic on 1/23/2024 as a new patient for evaluation.  Patient had some rigidity along with tremors on physical examination which is why a DaTscan was ordered for the patient along with an MRI to rule out any other etiologies.  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  At the last visit, patient was offered carbidopa-levodopa but he had deferred that since he was not very symptomatic.  Patient's MRI had shown lacunar infarcts which we discussed at the last visit as well and initiated the patient on aspirin 81 mg daily for stroke prevention.  Also, patient did have some swelling of his right hand and a duplex study was ordered for him which was negative.    After the last office visit, patient's son called in regards to possibly initiating Sinemet.  After that, I reached out to the patient in regards to possible side effects of Sinemet and any other questions or concerns.  The patient did not respond to my message and as result, Sinemet was not prescribed for the patient.    Since last time the patient was seen in the office, patient states his tremors are getting worse. He reports that he feels like he is falling forward when standing up. Then, patient states that he has been feeling this numbness in his hand which improves with massaging along with some contractures. He denies any other neurological symptoms.     There are no other questions or concerns at this time.    Previous Work-Up:  - MRI Brain w wo contrast 3/23/24: No mass effect, acute intracranial hemorrhage or evidence of recent infarction. Mild to moderate chronic microvascular ischemic change. No abnormal parenchymal or leptomeningeal enhancement identified. Tiny posterior gangliocapsular lacunar infarcts with otherwise no significant signal abnormality involving the basal ganglia as clinically questioned.  - NM Brain JUDY scan w/Rx 4/29/24: Abnormal radiotracer distribution. 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     Review of Systems   Neurological:  Positive for tremors. Negative for dizziness, seizures, syncope, facial asymmetry, speech difficulty, weakness, light-headedness, numbness and headaches.       Pertinent Medical History   Medical History Reviewed by  "provider this encounter:     .  Past Medical History   Past Medical History:   Diagnosis Date    Hypertension     Liver disease      Past Surgical History:   Procedure Laterality Date    PROSTATE SURGERY       No family history on file.   reports that he quit smoking about 5 years ago. His smoking use included cigarettes. He has never used smokeless tobacco. He reports that he does not currently use alcohol. He reports that he does not currently use drugs.  Current Outpatient Medications   Medication Instructions    amLODIPine (NORVASC) 10 mg, Oral, Daily    Aspirin Low Dose 81 mg, Oral, Daily, Chew    carbidopa-levodopa (Sinemet)  mg per tablet 1 tablet, Oral, 3 times daily    lisinopril (ZESTRIL) 40 mg, Oral, Daily    metFORMIN (GLUCOPHAGE) 500 mg, Oral, 2 times daily with meals, 2 tablets twice a day    rosuvastatin (CRESTOR) 10 mg, Oral, Daily   Allergies[1]   Medications Ordered Prior to Encounter[2]   Social History     Tobacco Use    Smoking status: Former     Current packs/day: 0.00     Types: Cigarettes     Quit date:      Years since quittin.3    Smokeless tobacco: Never   Vaping Use    Vaping status: Never Used   Substance and Sexual Activity    Alcohol use: Not Currently    Drug use: Not Currently    Sexual activity: Not on file        Objective   /74 (BP Location: Left arm, Patient Position: Sitting, Cuff Size: Large)   Pulse 91   Ht 5' 11\" (1.803 m)   Wt 72.6 kg (160 lb)   SpO2 97%   BMI 22.32 kg/m²     Physical Exam  Vitals reviewed.   Constitutional:       Appearance: Normal appearance.   HENT:      Head: Normocephalic and atraumatic.      Mouth/Throat:      Mouth: Mucous membranes are moist.     Eyes:      General: Lids are normal.      Extraocular Movements: Extraocular movements intact.      Conjunctiva/sclera: Conjunctivae normal.      Pupils: Pupils are equal, round, and reactive to light.       Cardiovascular:      Rate and Rhythm: Normal rate.   Pulmonary:      Effort: " Pulmonary effort is normal.     Musculoskeletal:         General: Normal range of motion.     Skin:     General: Skin is warm.     Neurological:      General: No focal deficit present.      Mental Status: He is alert.      Motor: Motor strength is normal.     Coordination: Romberg sign negative.      Deep Tendon Reflexes:      Reflex Scores:       Tricep reflexes are 2+ on the right side and 2+ on the left side.       Bicep reflexes are 2+ on the right side and 2+ on the left side.       Brachioradialis reflexes are 2+ on the right side and 2+ on the left side.       Patellar reflexes are 2+ on the right side and 2+ on the left side.       Achilles reflexes are 2+ on the right side and 2+ on the left side.    Psychiatric:         Mood and Affect: Mood normal.         Speech: Speech normal.         Behavior: Behavior normal.     Neurological Exam  Mental Status  Alert. Oriented to person, place and time. Speech is normal. Language is fluent with no aphasia.    Cranial Nerves  CN II: Visual fields full to confrontation.  CN III, IV, VI: Extraocular movements intact bilaterally. Normal lids and orbits bilaterally. Pupils equal round and reactive to light bilaterally.  CN V: Facial sensation is normal.  CN VII: Full and symmetric facial movement.  CN VIII: Hearing is normal.  CN IX, X: Palate elevates symmetrically. Normal gag reflex.  CN XI: Shoulder shrug strength is normal.  CN XII: Tongue midline without atrophy or fasciculations.    Motor  Normal muscle bulk throughout. No fasciculations present. Increased muscle tone. Cogwheel rigidity in the R wrist. The following abnormal movements were seen: Tremor in the RUE at rest and with ambulation.   Strength is 5/5 throughout all four extremities.    Sensory  Light touch is normal in upper and lower extremities.     Reflexes                                            Right                      Left  Brachioradialis                    2+                         2+  Biceps                                  2+                         2+  Triceps                                2+                         2+  Patellar                                2+                         2+  Achilles                                2+                         2+    Coordination  Right: Finger-to-nose normal.Left: Finger-to-nose normal.    Gait  Casual gait: Wide stance. Normal stride length. Reduced right arm swing. Normal left arm swing. Romberg is absent.      Radiology Results Review : No pertinent imaging studies reviewed.    Administrative Statements   I have spent a total time of 35 minutes in caring for this patient on the day of the visit/encounter including Impressions, Counseling / Coordination of care, Documenting in the medical record, Reviewing/placing orders in the medical record (including tests, medications, and/or procedures), and Obtaining or reviewing history  .         [1] No Known Allergies  [2]   Current Outpatient Medications on File Prior to Visit   Medication Sig Dispense Refill    amLODIPine (NORVASC) 10 mg tablet Take 1 tablet (10 mg total) by mouth daily 90 tablet 1    aspirin (Aspirin Low Dose) 81 mg chewable tablet CHEW 1 TABLET BY MOUTH EVERY DAY 90 tablet 0    lisinopril (ZESTRIL) 40 mg tablet TAKE 1 TABLET BY MOUTH EVERY DAY 90 tablet 1    metFORMIN (GLUCOPHAGE) 500 mg tablet Take 1 tablet (500 mg total) by mouth 2 (two) times a day with meals 2 tablets twice a day (Patient taking differently: Take 1,000 mg by mouth in the morning and 1,000 mg in the evening. Take with meals. 2 tablets twice a day.) 180 tablet 1    rosuvastatin (CRESTOR) 10 MG tablet TAKE 1 TABLET BY MOUTH EVERY DAY 90 tablet 1     No current facility-administered medications on file prior to visit.

## 2025-05-19 NOTE — ASSESSMENT & PLAN NOTE
Patient is a 74-year-old male with a history of fatty liver, hypertension, hypokalemia, orthopnea, elevated glucose, hyperlipidemia, tremors, and smoking who presents to the clinic today in regards to follow-up for his tremors.  Patient's neurological examination is consistent with his prior examination.    We had an extensive conversation with the patient in regards to initiating Sinemet.  Patient was hesitant initially but given that his symptoms seem to be progressing, patient was agreeable to trying a smaller dose. Patient stated that he would like to hold off on the PT/OT referrals at this time because he would like to see if the Sinemet in itself is helping.  Patient currently has no falls or balance problems however, it was recommended that at one point he should initiate PT/OT.  Patient was agreeable to our plan.    Plan:  Initiate Sinemet  mg 1 tablet TID  Patient is to update us in 6 weeks in regards to symptoms  PT/OT referrals were placed at the last visit  Monitor for worsening symptoms  Call for any questions or concerns  The patient indicates understanding of these issues and agrees with the plan.  This patient case was discussed with Dr. Spear.  Return in about 3 months (around 8/20/2025).    Orders:    carbidopa-levodopa (Sinemet)  mg per tablet; Take 1 tablet by mouth in the morning and 1 tablet at noon and 1 tablet in the evening.

## 2025-05-19 NOTE — ASSESSMENT & PLAN NOTE
Patient's MRI showed that there were tiny posterior gangliocapsular lacunar infarcts.  Given that, patient is an appropriate candidate for starting aspirin 81 mg for stroke prevention.  Patient on Crestor 10 mg with last LDL of 49.  Patient reports no stroke-like symptoms since the last time he was seen in the office.     Plan:  For stroke prevention continue with: Aspirin 81 mg  BP goal < 130/80. At goal in office.  LDL goal <70, Continue Crestor 10 mg  Recommend mediterranean diet & regular exercise regimen atleast 4-5 times a week for 20-30 minutes.   Monitor for worsening symptoms  Call for any questions or concerns  Patient advised to go to the ED with any new stroke-like symptoms.

## 2025-05-20 ENCOUNTER — OFFICE VISIT (OUTPATIENT)
Dept: NEUROLOGY | Facility: CLINIC | Age: 75
End: 2025-05-20

## 2025-05-20 VITALS
BODY MASS INDEX: 22.4 KG/M2 | SYSTOLIC BLOOD PRESSURE: 132 MMHG | DIASTOLIC BLOOD PRESSURE: 74 MMHG | WEIGHT: 160 LBS | OXYGEN SATURATION: 97 % | HEIGHT: 71 IN | HEART RATE: 91 BPM

## 2025-05-20 DIAGNOSIS — I63.9 STROKE (HCC): ICD-10-CM

## 2025-05-20 DIAGNOSIS — R20.0 NUMBNESS: ICD-10-CM

## 2025-05-20 DIAGNOSIS — R25.1 TREMORS OF NERVOUS SYSTEM: Primary | ICD-10-CM

## 2025-05-20 RX ORDER — CARBIDOPA AND LEVODOPA 25; 100 MG/1; MG/1
1 TABLET ORAL 3 TIMES DAILY
Qty: 90 TABLET | Refills: 4 | Status: SHIPPED | OUTPATIENT
Start: 2025-05-20

## 2025-06-08 DIAGNOSIS — E11.9 TYPE 2 DIABETES MELLITUS WITHOUT COMPLICATION, WITHOUT LONG-TERM CURRENT USE OF INSULIN (HCC): ICD-10-CM

## 2025-06-12 DIAGNOSIS — R25.1 TREMORS OF NERVOUS SYSTEM: ICD-10-CM

## 2025-06-12 RX ORDER — CARBIDOPA AND LEVODOPA 25; 100 MG/1; MG/1
1 TABLET ORAL 3 TIMES DAILY
Qty: 270 TABLET | Refills: 3 | Status: SHIPPED | OUTPATIENT
Start: 2025-06-12

## 2025-06-12 RX ORDER — CARBIDOPA AND LEVODOPA 25; 100 MG/1; MG/1
1 TABLET ORAL 3 TIMES DAILY
Qty: 90 TABLET | Refills: 4 | Status: SHIPPED | OUTPATIENT
Start: 2025-06-12 | End: 2025-06-12 | Stop reason: SDUPTHER

## 2025-07-05 DIAGNOSIS — I63.9 STROKE (HCC): ICD-10-CM

## 2025-07-07 RX ORDER — ASPIRIN 81 MG
81 TABLET,CHEWABLE ORAL DAILY
Qty: 90 TABLET | Refills: 1 | Status: SHIPPED | OUTPATIENT
Start: 2025-07-07

## 2025-07-21 ENCOUNTER — VBI (OUTPATIENT)
Dept: ADMINISTRATIVE | Facility: OTHER | Age: 75
End: 2025-07-21

## 2025-07-21 NOTE — TELEPHONE ENCOUNTER
07/21/25 7:42 AM     Chart reviewed for CRC: Colonoscopy ; nothing is submitted to the patient's insurance at this time.     Mary Valle [facility]

## 2025-08-11 ENCOUNTER — NURSE TRIAGE (OUTPATIENT)
Age: 75
End: 2025-08-11

## 2025-08-22 ENCOUNTER — EVALUATION (OUTPATIENT)
Dept: PHYSICAL THERAPY | Facility: CLINIC | Age: 75
End: 2025-08-22
Payer: COMMERCIAL

## 2025-08-22 DIAGNOSIS — R25.1 TREMORS OF NERVOUS SYSTEM: Primary | ICD-10-CM

## 2025-08-22 PROCEDURE — 97162 PT EVAL MOD COMPLEX 30 MIN: CPT

## 2025-08-22 PROCEDURE — 97110 THERAPEUTIC EXERCISES: CPT
